# Patient Record
Sex: FEMALE | Race: WHITE | NOT HISPANIC OR LATINO | Employment: FULL TIME | URBAN - NONMETROPOLITAN AREA
[De-identification: names, ages, dates, MRNs, and addresses within clinical notes are randomized per-mention and may not be internally consistent; named-entity substitution may affect disease eponyms.]

---

## 2017-08-28 DIAGNOSIS — M25.561 PAIN IN BOTH KNEES, UNSPECIFIED CHRONICITY: Primary | ICD-10-CM

## 2017-08-28 DIAGNOSIS — M25.562 PAIN IN BOTH KNEES, UNSPECIFIED CHRONICITY: Primary | ICD-10-CM

## 2017-08-29 ENCOUNTER — OFFICE VISIT (OUTPATIENT)
Dept: ORTHOPEDIC SURGERY | Facility: CLINIC | Age: 37
End: 2017-08-29

## 2017-08-29 VITALS — BODY MASS INDEX: 26.52 KG/M2 | WEIGHT: 165 LBS | HEIGHT: 66 IN

## 2017-08-29 DIAGNOSIS — S86.811A STRAIN OF CALF MUSCLE, RIGHT, INITIAL ENCOUNTER: ICD-10-CM

## 2017-08-29 DIAGNOSIS — S86.812A STRAIN OF CALF MUSCLE, LEFT, INITIAL ENCOUNTER: ICD-10-CM

## 2017-08-29 DIAGNOSIS — M25.562 PAIN IN BOTH KNEES, UNSPECIFIED CHRONICITY: Primary | ICD-10-CM

## 2017-08-29 DIAGNOSIS — M25.561 PAIN IN BOTH KNEES, UNSPECIFIED CHRONICITY: Primary | ICD-10-CM

## 2017-08-29 PROCEDURE — 99214 OFFICE O/P EST MOD 30 MIN: CPT | Performed by: ORTHOPAEDIC SURGERY

## 2017-08-29 RX ORDER — MELOXICAM 15 MG/1
15 TABLET ORAL DAILY
Qty: 30 TABLET | Refills: 2 | Status: SHIPPED | OUTPATIENT
Start: 2017-08-29 | End: 2019-02-25

## 2019-02-25 ENCOUNTER — CLINICAL SUPPORT (OUTPATIENT)
Dept: AUDIOLOGY | Facility: CLINIC | Age: 39
End: 2019-02-25

## 2019-02-25 ENCOUNTER — OFFICE VISIT (OUTPATIENT)
Dept: OTOLARYNGOLOGY | Facility: CLINIC | Age: 39
End: 2019-02-25

## 2019-02-25 VITALS — HEIGHT: 67 IN | TEMPERATURE: 97.9 F | WEIGHT: 142.8 LBS | BODY MASS INDEX: 22.41 KG/M2

## 2019-02-25 DIAGNOSIS — H60.319 DIFFUSE OTITIS EXTERNA, UNSPECIFIED CHRONICITY, UNSPECIFIED LATERALITY: Primary | ICD-10-CM

## 2019-02-25 DIAGNOSIS — R42 VERTIGO: ICD-10-CM

## 2019-02-25 DIAGNOSIS — H90.3 SENSORINEURAL HEARING LOSS (SNHL) OF BOTH EARS: ICD-10-CM

## 2019-02-25 DIAGNOSIS — H90.3 SENSORINEURAL HEARING LOSS, BILATERAL: Primary | ICD-10-CM

## 2019-02-25 PROCEDURE — 99204 OFFICE O/P NEW MOD 45 MIN: CPT | Performed by: OTOLARYNGOLOGY

## 2019-02-25 PROCEDURE — 92557 COMPREHENSIVE HEARING TEST: CPT | Performed by: AUDIOLOGIST

## 2019-02-25 PROCEDURE — 92567 TYMPANOMETRY: CPT | Performed by: AUDIOLOGIST

## 2019-02-25 RX ORDER — MECLIZINE HCL 12.5 MG/1
12.5 TABLET ORAL 3 TIMES DAILY PRN
Qty: 30 TABLET | Refills: 0 | Status: SHIPPED | OUTPATIENT
Start: 2019-02-25 | End: 2020-01-30

## 2019-02-25 RX ORDER — CLOBETASOL PROPIONATE 0.5 MG/ML
LOTION TOPICAL
Qty: 118 ML | Refills: 1 | OUTPATIENT
Start: 2019-02-25 | End: 2019-06-18

## 2019-02-25 RX ORDER — MECLIZINE HCL 12.5 MG/1
12.5 TABLET ORAL 3 TIMES DAILY PRN
COMMUNITY
End: 2019-02-25 | Stop reason: SDUPTHER

## 2019-02-25 RX ORDER — RANITIDINE 150 MG/1
150 CAPSULE ORAL
COMMUNITY
End: 2020-01-30

## 2019-02-25 NOTE — PROGRESS NOTES
Subjective   Mami Garcia is a 39 y.o. female.   Itchy ears drainage hearing loss and history of vertigo  History of Present Illness   Patient has a history of itchy draining ear for several months has been worse in the left than the right been gone for a couple years she has had dizziness been treated by others with meclizine.  She is taking meclizine it makes her drowsy dizziness is not really consistent not associate with any particular times a day or any positional changes she notes no consistent hearing loss no fluctuating hearing loss no significant tenderness she says she is noted to have abnormal hearing test at work wears hearing protection but it makes her ears itch worse uncomfortable    She says she has had ear problems that she is 14 years old with ear infections of undetermined type she is allergic to multiple antibiotics  The following portions of the patient's history were reviewed and updated as appropriate: allergies, current medications, past family history, past medical history, past social history, past surgical history and problem list.      Mami Garcia reports that she has quit smoking. she has never used smokeless tobacco. She reports that she does not use drugs.  Patient is not a tobacco user and has been counseled for use of tobacco products    Family History   Problem Relation Age of Onset   • Diabetes Other    • Heart disease Other    • Hypertension Other          Current Outpatient Medications:   •  meclizine (ANTIVERT) 12.5 MG tablet, Take 1 tablet by mouth 3 (Three) Times a Day As Needed for dizziness., Disp: 30 tablet, Rfl: 0  •  ranitidine (ZANTAC) 150 MG capsule, Take 150 mg by mouth., Disp: , Rfl:   •  clobetasol (CLOBEX) 0.05 % lotion, Apply  topically to the appropriate area as directed Daily. Use at night, Disp: 118 mL, Rfl: 1    Allergies   Allergen Reactions   • Amoxicillin    • Biaxin [Clarithromycin]    • Cephalosporins    • Penicillins    • Sulfa  Antibiotics        Past Medical History:   Diagnosis Date   • Abdominal pain    • Acute otitis media    • Acute pharyngitis    • Cyst of ovary    • Dysfunction of eustachian tube    • Dyspareunia    • Dysphagia    • Dysuria    • Epigastric pain    • Fatigue    • Foreign body in vulva and vagina    • Gastroesophageal reflux disease    • Increased frequency of urination    • Left lower quadrant pain    • Lumbosacral strain    • Menometrorrhagia    • Nausea    • Oral herpes     oral mucosal herpes   • Pain in eye     recent hx of grease into OS, no significant eye injury   • Pain in pelvis    • Patient noncompliance, general    • Plantar fasciitis    • Right lower quadrant pain    • Suprapubic pain     suprapubic tenderness   • Upper respiratory infection    • Vertigo    • Vulvovaginitis        Past Surgical History:   Procedure Laterality Date   •  SECTION  10/04/2002    primary low transverse C section, bilaterally tubal clipping with Filshie clips   • DIAGNOSTIC LAPAROSCOPY  2007    minimal endometriosis, adhesions of the lower uterine segment to the bladder       Review of Systems   HENT: Positive for ear discharge and hearing loss.    Neurological: Positive for dizziness.   All other systems reviewed and are negative.          Objective   Physical Exam   Constitutional: She is oriented to person, place, and time. She appears well-developed and well-nourished.   HENT:   Head: Normocephalic and atraumatic.   Right Ear: Hearing, tympanic membrane and external ear normal.   Left Ear: Hearing, tympanic membrane and external ear normal.   Ears:    Nose: Nose normal. No mucosal edema, rhinorrhea, nasal deformity or septal deviation. No epistaxis. Right sinus exhibits no maxillary sinus tenderness and no frontal sinus tenderness. Left sinus exhibits no maxillary sinus tenderness and no frontal sinus tenderness.   Mouth/Throat: Uvula is midline and oropharynx is clear and moist. No trismus in the jaw. Normal  dentition. No oropharyngeal exudate or posterior oropharyngeal edema.   Eyes: Conjunctivae and EOM are normal. Pupils are equal, round, and reactive to light.   Neck: Trachea normal, normal range of motion and phonation normal. Neck supple. No JVD present. No tracheal deviation present. No thyroid mass and no thyromegaly present.   Cardiovascular: Normal rate and regular rhythm.   Pulmonary/Chest: Effort normal and breath sounds normal.   Musculoskeletal: Normal range of motion.   Lymphadenopathy:        Head (right side): No submental, no submandibular, no tonsillar, no preauricular, no posterior auricular and no occipital adenopathy present.        Head (left side): No submental, no submandibular, no tonsillar, no preauricular, no posterior auricular and no occipital adenopathy present.     She has no cervical adenopathy.        Right cervical: No superficial cervical, no deep cervical and no posterior cervical adenopathy present.       Left cervical: No superficial cervical, no deep cervical and no posterior cervical adenopathy present.   Neurological: She is alert and oriented to person, place, and time. No cranial nerve deficit.   Skin: Skin is warm.   Psychiatric: She has a normal mood and affect. Her speech is normal. Thought content normal.   Nursing note and vitals reviewed.    Audiogram was reviewed in detail showing mild high-frequency hearing loss in the high frequencies and normal tympanograms no evidence of fluid or infection      Neurologic exam including gait and stance finger-nose alternating motions cerebellar Romberg Royal-Hallpike all testing was normal  Assessment/Plan   Mami was seen today for hearing loss and ear drainage.    Diagnoses and all orders for this visit:    Diffuse otitis externa, unspecified chronicity, unspecified laterality    Vertigo    Other orders  -     meclizine (ANTIVERT) 12.5 MG tablet; Take 1 tablet by mouth 3 (Three) Times a Day As Needed for dizziness.  -     clobetasol  (CLOBEX) 0.05 % lotion; Apply  topically to the appropriate area as directed Daily. Use at night      I told the patient she should not work if she has to take meclizine she should not work if she is dizzy she does not drive she is dizzy she not do any dangerous activity she is dizzy.  She is convinced that the itchiness and the skin problem tends to make her dizzy.  Told her that is not usually the case we may need to do further workup in any event she should be very careful about activities where she could hurt herself or others and not participate now she is at all dizzy.    She does have a mild hearing high-pitched hearing loss consistent with noise induced hearing loss we talked about strategies to get better earplugs and she is going to talk to the audiologist about that separately told her she could eventually come down for she does not protect her hearing    The dizziness persists down this time we should consider an ENG and other diagnostic testing including imaging we will see her back in a short period time meantime she will use the drops keep her ears dry and not use Q-tips

## 2019-02-25 NOTE — PATIENT INSTRUCTIONS

## 2019-02-25 NOTE — PROGRESS NOTES
STANDARD AUDIOMETRIC EVALUATION      Name:  Mami Garcia  :  1980  Age:  39 y.o.  Date of Evaluation:  2019      HISTORY    Reason for visit:  Mami Garcia is seen today for a hearing evaluation at the request of Dr. Mynor Velasco.  Patient reports she has been having ear problems since she was 14 years old.  She states she has had vertigo for the past 2-3 years.  She states she takes Meclizine for her dizziness.  Reportedly, she failed a hearing test at work in her left ear, and she was told her hearing has gotten worse this year.  She states she wears hearing protection at work.       EVALUATION    See Audiogram    RESULTS        Otoscopy and Tympanometry 226 Hz :  Right Ear:  Otoscopy:  Clear ear canal          Tympanometry:  Middle ear function within normal limits    Left Ear:   Otoscopy:  Clear ear canal        Tympanometry:  Middle ear function within normal limits    Test technique:  Standard Audiometry     Pure Tone Audiometry:   Patient responded to pure tones at 5-35 dB for 250-8000 Hz in right ear, and at 5-30 dB for 250-8000 Hz in left ear.       Speech Audiometry:        Right Ear:  Speech Reception Threshold (SRT) was obtained at 20 dBHL                 Speech Discrimination scores were 100% in quiet when words were presented at 60 dBHL       Left Ear:  Speech Reception Threshold (SRT) was obtained at 20 dBHL                 Speech Discrimination scores were 100% in quiet when words were presented at 60 dBHL    Reliability:   good    IMPRESSIONS:  1.  Tympanometry results are consistent with Middle ear function within normal limits in both ears.  2.  Pure tone results are consistent with normal to mild sloping sensorineural hearing loss for both ears.       RECOMMENDATIONS:  Patient is seeing the Ear Nose and Throat physician immediately following this examination.  It was a pleasure seeing Mami Garcia in Audiology today.  We would be happy to do further  testing or discuss these test as necessary.          This document has been electronically signed by Marie Blair MS CCC-JEFF on February 25, 2019 3:32 PM       Marie Blair MS CCC-A  Licensed Audiologist

## 2019-04-17 ENCOUNTER — APPOINTMENT (OUTPATIENT)
Dept: LAB | Facility: HOSPITAL | Age: 39
End: 2019-04-17

## 2019-04-17 PROCEDURE — 87507 IADNA-DNA/RNA PROBE TQ 12-25: CPT | Performed by: NURSE PRACTITIONER

## 2019-10-07 ENCOUNTER — OFFICE VISIT (OUTPATIENT)
Dept: OTOLARYNGOLOGY | Facility: CLINIC | Age: 39
End: 2019-10-07

## 2019-10-07 VITALS — BODY MASS INDEX: 22.82 KG/M2 | HEIGHT: 67 IN | TEMPERATURE: 97.8 F | WEIGHT: 145.4 LBS

## 2019-10-07 DIAGNOSIS — H90.3 SENSORINEURAL HEARING LOSS (SNHL) OF BOTH EARS: Primary | ICD-10-CM

## 2019-10-07 PROCEDURE — 99213 OFFICE O/P EST LOW 20 MIN: CPT | Performed by: OTOLARYNGOLOGY

## 2019-10-07 NOTE — PATIENT INSTRUCTIONS

## 2019-10-08 NOTE — PROGRESS NOTES
Subjective   Mami Delicia Garcia is a 39 y.o. female.     Follow-up  History of Present Illness   Patient said her ear itchiness is been less of a problem but she is having some dizziness is more bothersome comes and goes and goes she has no pressure no fluctuating hearing loss happens once a week there is no known factor which brings it on she describes as a spinning she takes meclizine and that usually works within an hour to      The following portions of the patient's history were reviewed and updated as appropriate: allergies, current medications, past family history, past medical history, past social history, past surgical history and problem list.      Current Outpatient Medications:   •  meclizine (ANTIVERT) 12.5 MG tablet, Take 1 tablet by mouth 3 (Three) Times a Day As Needed for dizziness., Disp: 30 tablet, Rfl: 0  •  ranitidine (ZANTAC) 150 MG capsule, Take 150 mg by mouth., Disp: , Rfl:     Allergies   Allergen Reactions   • Amoxicillin    • Biaxin [Clarithromycin]    • Cephalosporins    • Penicillins    • Sulfa Antibiotics              Review of Systems   Constitutional: Negative for fever.   HENT: Positive for hearing loss. Negative for ear discharge.    Neurological: Positive for dizziness.           Objective   Physical Exam   Constitutional: She appears well-developed and well-nourished.   HENT:   Head: Normocephalic.   Right Ear: Tympanic membrane, external ear and ear canal normal.   Left Ear: Tympanic membrane, external ear and ear canal normal.   Nose: Nose normal.   Mouth/Throat: Uvula is midline and oropharynx is clear and moist.   Eyes: Conjunctivae are normal.   Neck: Normal range of motion.   Pulmonary/Chest: Effort normal.   Musculoskeletal: Normal range of motion.   Neurological: She is alert.   Psychiatric: She has a normal mood and affect. Thought content normal.   Normal gait normal coordination and no nystagmus on exam        Assessment/Plan   Mami was seen today for  follow-up.    Diagnoses and all orders for this visit:    Sensorineural hearing loss (SNHL) of both ears    Because of her dizziness were going to work this up further and arrange for an ENG, electrocochleography.    she is to be off the meclizine for 3 days prior to test that she should not drive or do any dangerous activity she feels dizzy in order to avoid injury.    This discussed we will see her back in the next few weeks and plan follow-up as the ENG is done which we will try and facilitate as soon as possible  Continue the steroid lotion

## 2019-10-24 ENCOUNTER — CLINICAL SUPPORT (OUTPATIENT)
Dept: AUDIOLOGY | Facility: CLINIC | Age: 39
End: 2019-10-24

## 2019-10-24 DIAGNOSIS — R42 DIZZINESS: Primary | ICD-10-CM

## 2019-10-24 PROCEDURE — 92584 ELECTROCOCHLEOGRAPHY: CPT | Performed by: AUDIOLOGIST

## 2019-10-24 PROCEDURE — 92540 BASIC VESTIBULAR EVALUATION: CPT | Performed by: AUDIOLOGIST

## 2019-10-24 PROCEDURE — 92537 CALORIC VSTBLR TEST W/REC: CPT | Performed by: AUDIOLOGIST

## 2019-10-24 PROCEDURE — 92547 SUPPLEMENTAL ELECTRICAL TEST: CPT | Performed by: AUDIOLOGIST

## 2019-10-30 NOTE — PROGRESS NOTES
ELECTRONYSTAGMOGRAPHY (ENG)    Name:  Mami Garcia  :  1980  Age:  39 y.o.  Date of Evaluation:  10/30/2019  Referring Provider: No ref. provider found     HISTORY      Reason for visit:  Mami Garcia is seen today at the request of Dr. Mynor Velasco for an evaluation of dizziness.  Patient reports she feels dizzy which she describes as an off balance feeling.  She states sometimes she feels spinning when she is lying down.  She states sometimes her ears feel full like they are stopped up.  She states she has a hearing loss, and she has ringing in her ears.  She states the dizziness starts randomly without any specific triggers.  She states she takes Meclizine which seems to help the dizziness go away.      EVOKED POTENTIALS (ABR/ECoG) RESULTS:    During ECoG, SP/AP ratio was .346 in left ear and .347 in right ear.  SP/AP ratio of less than .37 is borderline within normal limits in both ears.        Electronystagmography RESULTS:     Saccades:  Within Normal Limits with no break up noted  Smooth Pursuit:  Within Normal Limits with no break up noted  Gaze, with eyes open:  No Gaze nystagmus recorded or observed  Spontaneous, with eyes closed:  Consistent right-beating nystagmus present during all eye closed positions: Eyes right at 6.7 degrees/second, Eyes center at 4.4 degrees/second, Eyes Left at 4.8 degrees/second.  Right-beating nystagmus is stronger when eyes turned to the right, following Jose's Law.  Optokinetics:  Within normal limits and symmetrical  Odalys-Hallpike:  Negative for BPPV at this time  Positional:   Right-beating nystagmus present during all 5 head positions:  Supine at 3.6 deg/sec, Head Right at 5.7 deg/sec, Head Left at 2.8 deg/sec, Right Side-lying at 7.2 deg/sec, and Left Side-lying at 5.7 deg/sec.  Right-beating nystagmus was the strongest during Right Side-lying position.  Bithermal Calorics:  Caloric Weakness 7% in right ear.  Directional Preponderance 13%  towards right.     Visual Fixation Suppression:  Good visual fixation suppression      IMPRESSIONS:  1.  ECoG results SP/AP ratio of slightly less than .37 is borderline within normal limits bilaterally.   2.  ENG results:  Right-beating spontaneous nystagmus beats towards the stronger ear, indicating a left peripheral vestibular lesion of recent onset.  Right-beating, direction-fixed, positional nystagmus present in all head positions also suggests a peripheral vestibular lesion.    RECOMMENDATIONS:  Test results will be forwarded to Dr. Mynor Velasco for his review.  It was a pleasure seeing Mami Garcia in Audiology today.  We would be happy to do further testing or discuss these tests as necessary.            This document has been electronically signed by Marie Blair MS CCC-JEFF on October 30, 2019 8:15 AM       Marie Blair MS CCC-JEFF  Licensed Audiologist

## 2019-11-18 ENCOUNTER — OFFICE VISIT (OUTPATIENT)
Dept: OTOLARYNGOLOGY | Facility: CLINIC | Age: 39
End: 2019-11-18

## 2019-11-18 VITALS — WEIGHT: 146.6 LBS | HEIGHT: 67 IN | TEMPERATURE: 98.5 F | BODY MASS INDEX: 23.01 KG/M2

## 2019-11-18 DIAGNOSIS — H60.319 DIFFUSE OTITIS EXTERNA, UNSPECIFIED CHRONICITY, UNSPECIFIED LATERALITY: ICD-10-CM

## 2019-11-18 DIAGNOSIS — R42 VERTIGO: ICD-10-CM

## 2019-11-18 DIAGNOSIS — H90.3 SENSORINEURAL HEARING LOSS (SNHL) OF BOTH EARS: Primary | ICD-10-CM

## 2019-11-18 PROCEDURE — 99213 OFFICE O/P EST LOW 20 MIN: CPT | Performed by: OTOLARYNGOLOGY

## 2019-11-18 RX ORDER — CIPROFLOXACIN AND DEXAMETHASONE 3; 1 MG/ML; MG/ML
4 SUSPENSION/ DROPS AURICULAR (OTIC) 2 TIMES DAILY
Qty: 7.5 ML | Refills: 0 | Status: SHIPPED | OUTPATIENT
Start: 2019-11-18 | End: 2020-01-30

## 2019-11-18 NOTE — PROGRESS NOTES
Subjective   Mami Delicia Garcia is a 39 y.o. female.   Follow-up dizziness    History of Present Illness   She states she has not had use meclizine recently and she does not like to use it because it makes her drowsy her dizziness is much better than it had been there is no unusual dizziness recently in terms of causing her to have imbalance falling she continues to work and do normal activity comes for review of her EMG she has no ear drainage to speak of though feels some tonsil moist she does not actually notice drainage      The following portions of the patient's history were reviewed and updated as appropriate: allergies, current medications, past family history, past medical history, past social history, past surgical history and problem list.      Current Outpatient Medications:   •  ciprofloxacin-dexamethasone (CIPRODEX) 0.3-0.1 % otic suspension, Administer 4 drops into the left ear 2 (Two) Times a Day. Use for 4 days, Disp: 7.5 mL, Rfl: 0  •  meclizine (ANTIVERT) 12.5 MG tablet, Take 1 tablet by mouth 3 (Three) Times a Day As Needed for dizziness., Disp: 30 tablet, Rfl: 0  •  ranitidine (ZANTAC) 150 MG capsule, Take 150 mg by mouth., Disp: , Rfl:     Allergies   Allergen Reactions   • Amoxicillin    • Biaxin [Clarithromycin]    • Cephalosporins    • Penicillins    • Sulfa Antibiotics              Review of Systems   Constitutional: Negative for fever.   HENT: Positive for hearing loss. Negative for ear discharge and ear pain.         Occasional left ear itching   Allergic/Immunologic: Positive for environmental allergies.   Neurological: Negative for dizziness.   Hematological: Negative for adenopathy.           Objective   Physical Exam   Constitutional: She appears well-developed and well-nourished.   HENT:   Head: Normocephalic.   Right Ear: Tympanic membrane, external ear and ear canal normal.   Left Ear: Tympanic membrane, external ear and ear canal normal.   Ears:    Nose: Nose normal.    Mouth/Throat: Uvula is midline and oropharynx is clear and moist.   Eyes: Conjunctivae and EOM are normal.   Neck: Normal range of motion.   Pulmonary/Chest: Effort normal.   Musculoskeletal: Normal range of motion.   Neurological: She is alert.   Psychiatric: She has a normal mood and affect. Thought content normal.   Nursing note and vitals reviewed.        ENG was reviewed in detail with the patient showing the results of the left-sided weakness  Assessment/Plan   Mami was seen today for follow-up.    Diagnoses and all orders for this visit:    Sensorineural hearing loss (SNHL) of both ears    Vertigo  -     Ambulatory Referral to Physical Therapy Vestibular    Diffuse otitis externa, unspecified chronicity, unspecified laterality    Other orders  -     ciprofloxacin-dexamethasone (CIPRODEX) 0.3-0.1 % otic suspension; Administer 4 drops into the left ear 2 (Two) Times a Day. Use for 4 days    Just use the drops only as needed basis for 4 days in left ear she has itching.  She will continue using her muscle versus earplugs as much as possible for work noise protection    We will recheck her hearing on follow-up in the meantime getting vestibular therapy set up to try to help with the balance based on the EMG findings

## 2019-11-18 NOTE — PATIENT INSTRUCTIONS

## 2020-03-04 PROBLEM — J11.1 INFLUENZA-LIKE ILLNESS: Status: ACTIVE | Noted: 2020-03-04

## 2020-03-04 PROCEDURE — 87086 URINE CULTURE/COLONY COUNT: CPT | Performed by: NURSE PRACTITIONER

## 2020-03-04 PROCEDURE — 87109 MYCOPLASMA: CPT | Performed by: NURSE PRACTITIONER

## 2020-07-27 ENCOUNTER — OFFICE VISIT (OUTPATIENT)
Dept: OTOLARYNGOLOGY | Facility: CLINIC | Age: 40
End: 2020-07-27

## 2020-07-27 VITALS — WEIGHT: 143 LBS | BODY MASS INDEX: 22.44 KG/M2 | HEIGHT: 67 IN | TEMPERATURE: 97.7 F

## 2020-07-27 DIAGNOSIS — H60.319 DIFFUSE OTITIS EXTERNA, UNSPECIFIED CHRONICITY, UNSPECIFIED LATERALITY: ICD-10-CM

## 2020-07-27 DIAGNOSIS — H90.3 SENSORINEURAL HEARING LOSS (SNHL) OF BOTH EARS: Primary | ICD-10-CM

## 2020-07-27 DIAGNOSIS — R42 VERTIGO: ICD-10-CM

## 2020-07-27 PROCEDURE — 99213 OFFICE O/P EST LOW 20 MIN: CPT | Performed by: OTOLARYNGOLOGY

## 2020-07-27 RX ORDER — MECLIZINE HYDROCHLORIDE 25 MG/1
25 TABLET ORAL 3 TIMES DAILY PRN
COMMUNITY
End: 2021-01-12

## 2020-07-27 RX ORDER — BETAMETHASONE DIPROPIONATE 0.5 MG/G
LOTION TOPICAL 2 TIMES DAILY
Qty: 60 ML | Refills: 1 | Status: SHIPPED | OUTPATIENT
Start: 2020-07-27 | End: 2021-10-13 | Stop reason: HOSPADM

## 2020-07-27 NOTE — PROGRESS NOTES
Subjective   Mami Delicia Garcia is a 40 y.o. female.   Ear problem    History of Present Illness   This patient comes back 8 months after she was last seen she no showed both in our office and was for vestibular therapy.  Says she has some discomfort in her ears and mainly itching she does not have any drainage no fever chills she is has a known history of otitis externa.  She also says she is having dizziness particular last 2 weeks she had been previously arranged to have physical therapy for positional type of dizziness she is having but never followed up she now comes back 8 months later because of similar complaints worsening she denies any major change in her hearing does have some congestion has no fever chills    Patient states her symptoms are usually short-lived but associated with motion particular turning head she is lying down one side or the other  The following portions of the patient's history were reviewed and updated as appropriate: allergies, current medications, past family history, past medical history, past social history, past surgical history and problem list.      Current Outpatient Medications:   •  meclizine (ANTIVERT) 25 MG tablet, Take 25 mg by mouth 3 (Three) Times a Day As Needed for Dizziness., Disp: , Rfl:   •  betamethasone dipropionate (DIPROLENE) 0.05 % lotion, Apply  topically to the appropriate area as directed 2 (Two) Times a Day., Disp: 60 mL, Rfl: 1    Allergies   Allergen Reactions   • Amoxicillin    • Biaxin [Clarithromycin]    • Cephalosporins    • Penicillins    • Sulfa Antibiotics              Review of Systems   Constitutional: Negative for fever.   HENT: Positive for congestion and ear pain. Negative for ear discharge, facial swelling and hearing loss.    Allergic/Immunologic: Positive for environmental allergies.   Neurological: Positive for dizziness. Negative for syncope.   Hematological: Negative for adenopathy.           Objective   Physical Exam      Constitutional: She appears well-developed and well-nourished.   HENT:   Head: Normocephalic.   Right Ear: Hearing, tympanic membrane, external ear and ear canal normal.   Left Ear: Hearing, tympanic membrane, external ear and ear canal normal.   Ears:    Nose: Mucosal edema present.   Mouth/Throat: Oropharynx is clear and moist and mucous membranes are normal.   Eyes: Conjunctivae are normal.   Neck: Neck supple.   Pulmonary/Chest: Effort normal.   Neurological: She is alert.   Skin: Skin is warm.   Psychiatric: She has a normal mood and affect.   Nursing note and vitals reviewed.        Old ENG and audiogram showing mild sensorineural hearing loss borderline was noted  Assessment/Plan   Mami was seen today for dizziness.    Diagnoses and all orders for this visit:    Sensorineural hearing loss (SNHL) of both ears  -     Ambulatory Referral to Physical Therapy Vestibular    Vertigo  -     Ambulatory Referral to Physical Therapy Vestibular    Diffuse otitis externa, unspecified chronicity, unspecified laterality    Other orders  -     betamethasone dipropionate (DIPROLENE) 0.05 % lotion; Apply  topically to the appropriate area as directed 2 (Two) Times a Day.    Begin Diprolene lotion to see if we can help with the itching I see no evidence of active infection is the vertigo her symptoms are clearly positional head changes and I think that she should see the therapist I do not think giving vestibular suppressant drugs is wise talked to the mouth once avoiding dangerous activities particularly driving other activities    We will see her back and reassess both the symptoms and check her hearing on follow-up and decide if further testing needs to be done she already had a previous EMG which documented some vestibular weakness and asked why she was referred previously to physical therapy but never followed up

## 2020-07-27 NOTE — PATIENT INSTRUCTIONS
MyPlate from USDA    MyPlate is an outline of a general healthy diet based on the 2010 Dietary Guidelines for Americans, from the U.S. Department of Agriculture (USDA). It sets guidelines for how much food you should eat from each food group based on your age, sex, and level of physical activity.  What are tips for following MyPlate?  To follow MyPlate recommendations:  · Eat a wide variety of fruits and vegetables, grains, and protein foods.  · Serve smaller portions and eat less food throughout the day.  · Limit portion sizes to avoid overeating.  · Enjoy your food.  · Get at least 150 minutes of exercise every week. This is about 30 minutes each day, 5 or more days per week.  It can be difficult to have every meal look like MyPlate. Think about MyPlate as eating guidelines for an entire day, rather than each individual meal.  Fruits and vegetables  · Make half of your plate fruits and vegetables.  · Eat many different colors of fruits and vegetables each day.  · For a 2,000 calorie daily food plan, eat:  ? 2½ cups of vegetables every day.  ? 2 cups of fruit every day.  · 1 cup is equal to:  ? 1 cup raw or cooked vegetables.  ? 1 cup raw fruit.  ? 1 medium-sized orange, apple, or banana.  ? 1 cup 100% fruit or vegetable juice.  ? 2 cups raw leafy greens, such as lettuce, spinach, or kale.  ? ½ cup dried fruit.  Grains  · One fourth of your plate should be grains.  · Make at least half of the grains you eat each day whole grains.  · For a 2,000 calorie daily food plan, eat 6 oz of grains every day.  · 1 oz is equal to:  ? 1 slice bread.  ? 1 cup cereal.  ? ½ cup cooked rice, cereal, or pasta.  Protein  · One fourth of your plate should be protein.  · Eat a wide variety of protein foods, including meat, poultry, fish, eggs, beans, nuts, and tofu.  · For a 2,000 calorie daily food plan, eat 5½ oz of protein every day.  · 1 oz is equal to:  ? 1 oz meat, poultry, or fish.  ? ¼ cup cooked beans.  ? 1 egg.  ? ½ oz nuts  or seeds.  ? 1 Tbsp peanut butter.  Dairy  · Drink fat-free or low-fat (1%) milk.  · Eat or drink dairy as a side to meals.  · For a 2,000 calorie daily food plan, eat or drink 3 cups of dairy every day.  · 1 cup is equal to:  ? 1 cup milk, yogurt, cottage cheese, or soy milk (soy beverage).  ? 2 oz processed cheese.  ? 1½ oz natural cheese.  Fats, oils, salt, and sugars  · Only small amounts of oils are recommended.  · Avoid foods that are high in calories and low in nutritional value (empty calories), like foods high in fat or added sugars.  · Choose foods that are low in salt (sodium). Choose foods that have less than 140 milligrams (mg) of sodium per serving.  · Drink water instead of sugary drinks. Drink enough water each day to keep your urine pale yellow.  Where to find support  · Work with your health care provider or a nutrition specialist (dietitian) to develop a customized eating plan that is right for you.  · Download an phill (mobile application) to help you track your daily food intake.  Where to find more information  · Go to ChooseMyPlate.gov for more information.  · Learn more and log your daily food intake according to MyPlate using USDA's SuperTracker: www.Maana Mobileer.usda.gov  Summary  · MyPlate is a general guideline for healthy eating from the USDA. It is based on the 2010 Dietary Guidelines for Americans.  · In general, fruits and vegetables should take up ½ of your plate, grains should take up ¼ of your plate, and protein should take up ¼ of your plate.  This information is not intended to replace advice given to you by your health care provider. Make sure you discuss any questions you have with your health care provider.  Document Released: 01/06/2009 Document Revised: 01/19/2019 Document Reviewed: 03/19/2018  Elsevier Patient Education © 2020 Elsevier Inc.

## 2020-07-30 ENCOUNTER — TELEPHONE (OUTPATIENT)
Dept: OTOLARYNGOLOGY | Facility: CLINIC | Age: 40
End: 2020-07-30

## 2020-07-30 NOTE — TELEPHONE ENCOUNTER
07/30/2020, 1059 - Patient telephoned.  Zero answer.  Voice message submitted with date, time, office contact information, and instruction to contact office regarding information needed for medical leave request.

## 2020-08-05 ENCOUNTER — TELEPHONE (OUTPATIENT)
Dept: OTOLARYNGOLOGY | Facility: CLINIC | Age: 40
End: 2020-08-05

## 2020-08-05 NOTE — TELEPHONE ENCOUNTER
08/05/2020, 1520 - Patient telephoned.  Zero answer.  Voice message submitted with date, time, office contact information, notification previous message submitted 07/30/2020 regarding additional information required for medical leave request, and instructions to contact office at earliest convenience.

## 2020-08-05 NOTE — TELEPHONE ENCOUNTER
----- Message from Fabio Guerrero sent at 8/5/2020  2:56 PM CDT -----  Contact: [phone]  [facility] papers from [employer], on behalf of patient's employer [employer], were supposed to be faxed to our office about a week ago. They said they have not received them back yet and patient is wanting to know if our office ever received the paperwork.

## 2020-08-06 ENCOUNTER — APPOINTMENT (OUTPATIENT)
Dept: PHYSICAL THERAPY | Facility: HOSPITAL | Age: 40
End: 2020-08-06

## 2020-08-06 ENCOUNTER — TELEPHONE (OUTPATIENT)
Dept: OTOLARYNGOLOGY | Facility: CLINIC | Age: 40
End: 2020-08-06

## 2020-08-06 NOTE — TELEPHONE ENCOUNTER
08/06/2020, 1539 - Patient telephoned with notification medical leave request forms have been completed, and she may collect them from the office of Dr. Velasco.  Patient verbalized understanding.

## 2020-08-11 ENCOUNTER — APPOINTMENT (OUTPATIENT)
Dept: PHYSICAL THERAPY | Facility: HOSPITAL | Age: 40
End: 2020-08-11

## 2020-08-17 ENCOUNTER — HOSPITAL ENCOUNTER (OUTPATIENT)
Dept: PHYSICAL THERAPY | Facility: HOSPITAL | Age: 40
Setting detail: THERAPIES SERIES
Discharge: HOME | End: 2020-08-17

## 2020-08-17 ENCOUNTER — TELEPHONE (OUTPATIENT)
Dept: OTOLARYNGOLOGY | Facility: CLINIC | Age: 40
End: 2020-08-17

## 2020-08-17 DIAGNOSIS — R42 VERTIGO: Primary | ICD-10-CM

## 2020-08-17 PROCEDURE — 97162 PT EVAL MOD COMPLEX 30 MIN: CPT | Performed by: PHYSICAL THERAPIST

## 2020-08-17 NOTE — THERAPY EVALUATION
Outpatient Physical Therapy Ortho Initial Evaluation  [facility]     Patient Name: Mami Garcia  : 1980  MRN: [id number]  Today's Date: 2020      Visit Date: 2020  Visit   Return to MD: JIM  Re-cert date: N/A    Patient Active Problem List   Diagnosis   • Pain in both knees   • Influenza-like illness        Past Medical History:   Diagnosis Date   • Abdominal pain    • Acute otitis media    • Acute pharyngitis    • Cyst of ovary    • Dysfunction of eustachian tube    • Dyspareunia    • Dysphagia    • Dysuria    • Epigastric pain    • Fatigue    • Foreign body in vulva and vagina    • Gastroesophageal reflux disease    • Increased frequency of urination    • Left lower quadrant pain    • Lumbosacral strain    • Menometrorrhagia    • Nausea    • Oral herpes     oral mucosal herpes   • Pain in eye     recent hx of grease into OS, no significant eye injury   • Pain in pelvis    • Patient noncompliance, general    • Plantar fasciitis    • Right lower quadrant pain    • Suprapubic pain     suprapubic tenderness   • Upper respiratory infection    • Vertigo    • Vulvovaginitis         Past Surgical History:   Procedure Laterality Date   •  SECTION  10/04/2002    primary low transverse C section, bilaterally tubal clipping with Filshie clips   • DIAGNOSTIC LAPAROSCOPY  2007    minimal endometriosis, adhesions of the lower uterine segment to the bladder       Visit Dx:     ICD-10-CM ICD-9-CM   1. Vertigo R42 780.4     Medications (Admitted on 2020)     betamethasone dipropionate (DIPROLENE) 0.05 % lotion     meclizine (ANTIVERT) 25 MG tablet      Allergies       Amoxicillin   Biaxin [Clarithromycin]   Cephalosporins   Penicillins   Sulfa Antibiotics             PT Ortho     Row Name 20 1600       Subjective Comments    Subjective Comments  39 yo female with insidious onset dizziness 3 years ago, diagnosed with vertigo at the hospital at the time. Prescribed  meclizine at the time which helped resolve her condition at the time. Has itchy ears in the past, takes drops for her ears. Has not had a dizzy spell for 3 weeks but before that had dizzy spells lasting a month prior to that. Aggravating factors: supine <>sit, bending over to tie shoes. Easing factors: laying on her back, meds-meclizine    -BS       Precautions and Contraindications    Precautions  vertigo  -BS       Subjective Pain    Able to rate subjective pain?  yes  -BS    Pre-Treatment Pain Level  0  -BS    Post-Treatment Pain Level  0  -BS       Posture/Observations    Posture/Observations Comments  rolling L to R produced dizziness, no nystagmus, eye tracking and quick head movements did not provoke any vertigo symptoms  -BS       General ROM    GENERAL ROM COMMENTS  AROM: B UE's grossly WFL, B LE's grossly WFL  -BS       MMT (Manual Muscle Testing)    General MMT Comments  5/5 B UE's and B LE's grossly  -BS       Sensation    Light Touch  No apparent deficits  -BS      User Key  (r) = Recorded By, (t) = Taken By, (c) = Cosigned By    Initials Name Provider Type    Santi Sosa, PT Physical Therapist                      Therapy Education  Education Details: Epley maneuver  Given: Other (comment)(vestibular rehab)  Program: New  How Provided: Demonstration  Provided to: Patient  Level of Understanding: Verbalized     PT OP Goals     Row Name 08/17/20 1600          PT Short Term Goals    STG Date to Achieve  08/28/20  -BS     STG 1  Pt indep with performing Home Epley maneuver  -BS     STG 1 Progress  New  -BS     STG 2  Able to perform all daily tasks without any vertigo symptoms  -BS     STG 2 Progress  New  -BS        Time Calculation    PT Goal Re-Cert Due Date  -- N/A  -BS       User Key  (r) = Recorded By, (t) = Taken By, (c) = Cosigned By    Initials Name Provider Type    Santi Sosa PT Physical Therapist          PT Assessment/Plan     Row Name 08/17/20 1700          PT Assessment     Assessment Comments  41 yo female presenting with slightly symptomatic case of BPPV. Elicited only with rolling to the right on the mat table. Resolved after performing the Epley maneuver going to the right.  -BS     Please refer to paper survey for additional self-reported information  Yes  -BS     Rehab Potential  Good  -BS     Patient/caregiver participated in establishment of treatment plan and goals  Yes  -BS     Patient would benefit from skilled therapy intervention  Yes  -BS        PT Plan    PT Frequency  Other (comment) 2 visits  -BS     Predicted Duration of Therapy Intervention (Therapy Eval)  2 visits  -BS     Planned CPT's?  PT EVAL MOD COMPLELITY: 60504;PT CANALITH REPOSITIONIN;PT THER PROC EA 15 MIN: 60609;PT NEUROMUSC RE-EDUCATION EA 15 MIN: 65622  -BS     Physical Therapy Interventions (Optional Details)  vestibular training  -BS     PT Plan Comments  f/u in 1 week to discern if vertigo has cleared completely.  -BS       User Key  (r) = Recorded By, (t) = Taken By, (c) = Cosigned By    Initials Name Provider Type    Santi Sosa, PT Physical Therapist            OP Exercises     Row Name 20 1600             Subjective Comments    Subjective Comments  41 yo female with insidious onset dizziness 3 years ago, diagnosed with vertigo at the hospital at the time. Prescribed meclizine at the time which helped resolve her condition at the time. Has itchy ears in the past, takes drops for her ears. Has not had a dizzy spell for 3 weeks but before that had dizzy spells lasting a month prior to that. Aggravating factors: supine <>sit, bending over to tie shoes. Easing factors: laying on her back, meds-meclizine    -BS         Subjective Pain    Able to rate subjective pain?  yes  -BS      Pre-Treatment Pain Level  0  -BS      Post-Treatment Pain Level  0  -BS         Exercise 1    Exercise Name 1  Epley maneuver going to the right  -BS      Time 1  4'  -BS        User Key  (r) = Recorded  By, (t) = Taken By, (c) = Cosigned By    Initials Name Provider Type    Santi Sosa, PT Physical Therapist                        Outcome Measure Options: Dizziness Handicap Inventory(P 10, E 10, F 22)         Time Calculation:     Start Time: 1649  Stop Time: 1714  Time Calculation (min): 25 min  Total Timed Code Minutes- PT: 25 minute(s)     Therapy Charges for Today     Code Description Service Date Service Provider Modifiers Qty    52755535184 HC PT EVAL MOD COMPLEXITY 2 8/17/2020 Santi Castañeda, PT GP 1          PT G-Codes  Outcome Measure Options: Dizziness Handicap Inventory(P 10, E 10, F 22)         Santi Castañeda, PT  8/17/2020

## 2020-08-21 ENCOUNTER — TELEPHONE (OUTPATIENT)
Dept: OTOLARYNGOLOGY | Facility: CLINIC | Age: 40
End: 2020-08-21

## 2020-08-21 NOTE — TELEPHONE ENCOUNTER
----- Message from Fabio Guerrero sent at 8/20/2020  3:08 PM CDT -----  Contact: [phone]  Left voicemail that she was wanting to know if you got the paperwork faxed off for her.

## 2020-09-21 ENCOUNTER — TELEPHONE (OUTPATIENT)
Dept: OTOLARYNGOLOGY | Facility: CLINIC | Age: 40
End: 2020-09-21

## 2020-09-21 NOTE — TELEPHONE ENCOUNTER
"09/21/2020, 0929 - Patient telephoned per this staff member [phone].  Zero answer.  Voice message submitted with request to contact office regarding the rescheduling of clinical appointment.    Note - Patient deemed a \"No Show\" for clinical appointment with Dr. Velasco Friday, September 18, 2020 at 8:15 A.M.   "

## 2020-12-25 ENCOUNTER — HOSPITAL ENCOUNTER (EMERGENCY)
Facility: HOSPITAL | Age: 40
Discharge: HOME | End: 2020-12-25
Attending: FAMILY MEDICINE | Admitting: FAMILY MEDICINE

## 2020-12-25 VITALS
SYSTOLIC BLOOD PRESSURE: 106 MMHG | OXYGEN SATURATION: 100 % | WEIGHT: 140 LBS | TEMPERATURE: 98.1 F | HEART RATE: 66 BPM | HEIGHT: 67 IN | DIASTOLIC BLOOD PRESSURE: 51 MMHG | BODY MASS INDEX: 21.97 KG/M2 | RESPIRATION RATE: 18 BRPM

## 2020-12-25 DIAGNOSIS — E83.42 HYPOMAGNESEMIA: Primary | ICD-10-CM

## 2020-12-25 LAB
ALBUMIN SERPL-MCNC: 4.4 G/DL (ref 3.5–5.2)
ALBUMIN/GLOB SERPL: 1.8 G/DL
ALP SERPL-CCNC: 45 U/L (ref 39–117)
ALT SERPL W P-5'-P-CCNC: 8 U/L (ref 1–33)
ANION GAP SERPL CALCULATED.3IONS-SCNC: 8 MMOL/L (ref 5–15)
AST SERPL-CCNC: 13 U/L (ref 1–32)
BASOPHILS # BLD AUTO: 0.02 10*3/MM3 (ref 0–0.2)
BASOPHILS NFR BLD AUTO: 0.3 % (ref 0–1.5)
BILIRUB SERPL-MCNC: 0.4 MG/DL (ref 0–1.2)
BILIRUB UR QL STRIP: NEGATIVE
BUN SERPL-MCNC: 13 MG/DL (ref 6–20)
BUN/CREAT SERPL: 19.7 (ref 7–25)
CALCIUM SPEC-SCNC: 9.9 MG/DL (ref 8.6–10.5)
CHLORIDE SERPL-SCNC: 108 MMOL/L (ref 98–107)
CK SERPL-CCNC: 51 U/L (ref 20–180)
CLARITY UR: CLEAR
CO2 SERPL-SCNC: 22 MMOL/L (ref 22–29)
COLOR UR: YELLOW
CREAT SERPL-MCNC: 0.66 MG/DL (ref 0.57–1)
D-DIMER, QUANTITATIVE (MAD,POW, STR): 322 NG/ML (FEU) (ref 0–470)
DEPRECATED RDW RBC AUTO: 42.3 FL (ref 37–54)
EOSINOPHIL # BLD AUTO: 0.04 10*3/MM3 (ref 0–0.4)
EOSINOPHIL NFR BLD AUTO: 0.7 % (ref 0.3–6.2)
ERYTHROCYTE [DISTWIDTH] IN BLOOD BY AUTOMATED COUNT: 12.5 % (ref 12.3–15.4)
GFR SERPL CREATININE-BSD FRML MDRD: 99 ML/MIN/1.73
GLOBULIN UR ELPH-MCNC: 2.4 GM/DL
GLUCOSE SERPL-MCNC: 93 MG/DL (ref 65–99)
GLUCOSE UR STRIP-MCNC: NEGATIVE MG/DL
HCG SERPL QL: NEGATIVE
HCT VFR BLD AUTO: 37.3 % (ref 34–46.6)
HGB BLD-MCNC: 12.5 G/DL (ref 12–15.9)
HGB UR QL STRIP.AUTO: NEGATIVE
HOLD SPECIMEN: NORMAL
HOLD SPECIMEN: NORMAL
IMM GRANULOCYTES # BLD AUTO: 0.01 10*3/MM3 (ref 0–0.05)
IMM GRANULOCYTES NFR BLD AUTO: 0.2 % (ref 0–0.5)
INR PPP: 1.31 (ref 0.8–1.2)
KETONES UR QL STRIP: ABNORMAL
LEUKOCYTE ESTERASE UR QL STRIP.AUTO: NEGATIVE
LYMPHOCYTES # BLD AUTO: 1.46 10*3/MM3 (ref 0.7–3.1)
LYMPHOCYTES NFR BLD AUTO: 23.8 % (ref 19.6–45.3)
MAGNESIUM SERPL-MCNC: 1.3 MG/DL (ref 1.6–2.6)
MCH RBC QN AUTO: 30.9 PG (ref 26.6–33)
MCHC RBC AUTO-ENTMCNC: 33.5 G/DL (ref 31.5–35.7)
MCV RBC AUTO: 92.3 FL (ref 79–97)
MONOCYTES # BLD AUTO: 0.35 10*3/MM3 (ref 0.1–0.9)
MONOCYTES NFR BLD AUTO: 5.7 % (ref 5–12)
NEUTROPHILS NFR BLD AUTO: 4.26 10*3/MM3 (ref 1.7–7)
NEUTROPHILS NFR BLD AUTO: 69.3 % (ref 42.7–76)
NITRITE UR QL STRIP: NEGATIVE
NRBC BLD AUTO-RTO: 0 /100 WBC (ref 0–0.2)
PH UR STRIP.AUTO: 5.5 [PH] (ref 5–9)
PLATELET # BLD AUTO: 214 10*3/MM3 (ref 140–450)
PMV BLD AUTO: 11.9 FL (ref 6–12)
POTASSIUM SERPL-SCNC: 4 MMOL/L (ref 3.5–5.2)
PROT SERPL-MCNC: 6.8 G/DL (ref 6–8.5)
PROT UR QL STRIP: NEGATIVE
PROTHROMBIN TIME: 16.9 SECONDS (ref 11.1–15.3)
RBC # BLD AUTO: 4.04 10*6/MM3 (ref 3.77–5.28)
SODIUM SERPL-SCNC: 138 MMOL/L (ref 136–145)
SP GR UR STRIP: 1.02 (ref 1–1.03)
UROBILINOGEN UR QL STRIP: ABNORMAL
WBC # BLD AUTO: 6.14 10*3/MM3 (ref 3.4–10.8)
WHOLE BLOOD HOLD SPECIMEN: NORMAL
WHOLE BLOOD HOLD SPECIMEN: NORMAL

## 2020-12-25 PROCEDURE — 85610 PROTHROMBIN TIME: CPT | Performed by: FAMILY MEDICINE

## 2020-12-25 PROCEDURE — 85379 FIBRIN DEGRADATION QUANT: CPT | Performed by: FAMILY MEDICINE

## 2020-12-25 PROCEDURE — 85025 COMPLETE CBC W/AUTO DIFF WBC: CPT | Performed by: FAMILY MEDICINE

## 2020-12-25 PROCEDURE — 84703 CHORIONIC GONADOTROPIN ASSAY: CPT | Performed by: FAMILY MEDICINE

## 2020-12-25 PROCEDURE — 25010000002 MAGNESIUM SULFATE 2 GM/50ML SOLUTION: Performed by: FAMILY MEDICINE

## 2020-12-25 PROCEDURE — 82550 ASSAY OF CK (CPK): CPT | Performed by: FAMILY MEDICINE

## 2020-12-25 PROCEDURE — 80053 COMPREHEN METABOLIC PANEL: CPT | Performed by: FAMILY MEDICINE

## 2020-12-25 PROCEDURE — 99283 EMERGENCY DEPT VISIT LOW MDM: CPT

## 2020-12-25 PROCEDURE — 81003 URINALYSIS AUTO W/O SCOPE: CPT | Performed by: FAMILY MEDICINE

## 2020-12-25 PROCEDURE — 83735 ASSAY OF MAGNESIUM: CPT | Performed by: FAMILY MEDICINE

## 2020-12-25 PROCEDURE — 96365 THER/PROPH/DIAG IV INF INIT: CPT

## 2020-12-25 RX ORDER — MAGNESIUM SULFATE HEPTAHYDRATE 40 MG/ML
2 INJECTION, SOLUTION INTRAVENOUS ONCE
Status: COMPLETED | OUTPATIENT
Start: 2020-12-25 | End: 2020-12-25

## 2020-12-25 RX ORDER — ONDANSETRON 4 MG/1
4 TABLET, ORALLY DISINTEGRATING ORAL EVERY 6 HOURS PRN
Qty: 10 TABLET | Refills: 0 | Status: SHIPPED | OUTPATIENT
Start: 2020-12-25 | End: 2021-10-13 | Stop reason: HOSPADM

## 2020-12-25 RX ADMIN — SODIUM CHLORIDE 1000 ML: 9 INJECTION, SOLUTION INTRAVENOUS at 17:53

## 2020-12-25 RX ADMIN — MAGNESIUM SULFATE IN WATER 2 G: 40 INJECTION, SOLUTION INTRAVENOUS at 18:11

## 2020-12-26 LAB — SARS-COV-2 N GENE RESP QL NAA+PROBE: NOT DETECTED

## 2020-12-26 PROCEDURE — 87635 SARS-COV-2 COVID-19 AMP PRB: CPT | Performed by: FAMILY MEDICINE

## 2021-01-12 ENCOUNTER — LAB (OUTPATIENT)
Dept: LAB | Facility: HOSPITAL | Age: 41
End: 2021-01-12

## 2021-01-12 ENCOUNTER — OFFICE VISIT (OUTPATIENT)
Dept: FAMILY MEDICINE CLINIC | Facility: CLINIC | Age: 41
End: 2021-01-12

## 2021-01-12 VITALS
BODY MASS INDEX: 23.45 KG/M2 | SYSTOLIC BLOOD PRESSURE: 108 MMHG | WEIGHT: 149.4 LBS | DIASTOLIC BLOOD PRESSURE: 60 MMHG | HEIGHT: 67 IN

## 2021-01-12 DIAGNOSIS — Z11.59 ENCOUNTER FOR HEPATITIS C SCREENING TEST FOR LOW RISK PATIENT: ICD-10-CM

## 2021-01-12 DIAGNOSIS — E83.42 HYPOMAGNESEMIA: Primary | ICD-10-CM

## 2021-01-12 DIAGNOSIS — Z01.419 WELL WOMAN EXAM: ICD-10-CM

## 2021-01-12 PROBLEM — M25.562 PAIN IN BOTH KNEES: Status: RESOLVED | Noted: 2017-08-29 | Resolved: 2021-01-12

## 2021-01-12 PROBLEM — J11.1 INFLUENZA-LIKE ILLNESS: Status: RESOLVED | Noted: 2020-03-04 | Resolved: 2021-01-12

## 2021-01-12 PROBLEM — M25.561 PAIN IN BOTH KNEES: Status: RESOLVED | Noted: 2017-08-29 | Resolved: 2021-01-12

## 2021-01-12 PROCEDURE — 86803 HEPATITIS C AB TEST: CPT | Performed by: FAMILY MEDICINE

## 2021-01-12 PROCEDURE — 80048 BASIC METABOLIC PNL TOTAL CA: CPT | Performed by: FAMILY MEDICINE

## 2021-01-12 PROCEDURE — 99202 OFFICE O/P NEW SF 15 MIN: CPT | Performed by: FAMILY MEDICINE

## 2021-01-12 PROCEDURE — 83735 ASSAY OF MAGNESIUM: CPT | Performed by: FAMILY MEDICINE

## 2021-01-12 PROCEDURE — 36415 COLL VENOUS BLD VENIPUNCTURE: CPT | Performed by: FAMILY MEDICINE

## 2021-01-12 NOTE — PROGRESS NOTES
Subjective   Mami Garcia is a 40 y.o. female.  Emergency room follow-up.  Was seen in emergency room on Mikhail Day for nausea dizziness found to have a low magnesium.  Was given IV fluids felt better had some mild acrocyanosis but all went away.  Currently feels well.  Past history significant for .  He is a former smoker denies alcohol or drug use.  Some family history of metabolic syndrome.  Is behind on visiting with gynecology for well woman exams.  History and sidebar are generated.    History of Present Illness     The following portions of the patient's history were reviewed and updated as appropriate: allergies, current medications, past family history, past medical history, past social history, past surgical history and problem list.    Review of Systems   All other systems reviewed and are negative.      Objective   Physical Exam  Constitutional:       Appearance: Normal appearance. She is normal weight.   Cardiovascular:      Rate and Rhythm: Normal rate.   Pulmonary:      Effort: Pulmonary effort is normal.   Abdominal:      General: Abdomen is flat.   Musculoskeletal: Normal range of motion.   Neurological:      Mental Status: She is alert.   Psychiatric:         Mood and Affect: Mood normal.         Behavior: Behavior normal.         Thought Content: Thought content normal.         Judgment: Judgment normal.           Assessment/Plan   Diagnoses and all orders for this visit:    1. Hypomagnesemia (Primary)  -     Magnesium  -     Basic Metabolic Panel    2. Encounter for hepatitis C screening test for low risk patient  -     Hepatitis C Antibody    3. Well woman exam  -     Ambulatory Referral to Obstetrics / Gynecology        Counseled on lifestyle measures counseled on Covid vaccine when available declines flu vaccine.  Orders as above recheck as needed

## 2021-01-13 LAB
ANION GAP SERPL CALCULATED.3IONS-SCNC: 7.8 MMOL/L (ref 5–15)
BUN SERPL-MCNC: 14 MG/DL (ref 6–20)
BUN/CREAT SERPL: 20.6 (ref 7–25)
CALCIUM SPEC-SCNC: 9.5 MG/DL (ref 8.6–10.5)
CHLORIDE SERPL-SCNC: 104 MMOL/L (ref 98–107)
CO2 SERPL-SCNC: 28.2 MMOL/L (ref 22–29)
CREAT SERPL-MCNC: 0.68 MG/DL (ref 0.57–1)
GFR SERPL CREATININE-BSD FRML MDRD: 95 ML/MIN/1.73
GLUCOSE SERPL-MCNC: 77 MG/DL (ref 65–99)
HCV AB SER DONR QL: NORMAL
MAGNESIUM SERPL-MCNC: 2.3 MG/DL (ref 1.6–2.6)
POTASSIUM SERPL-SCNC: 4 MMOL/L (ref 3.5–5.2)
SODIUM SERPL-SCNC: 140 MMOL/L (ref 136–145)

## 2021-10-13 ENCOUNTER — OFFICE VISIT (OUTPATIENT)
Dept: OBSTETRICS AND GYNECOLOGY | Facility: CLINIC | Age: 41
End: 2021-10-13

## 2021-10-13 VITALS
BODY MASS INDEX: 23.54 KG/M2 | HEIGHT: 67 IN | WEIGHT: 150 LBS | SYSTOLIC BLOOD PRESSURE: 104 MMHG | DIASTOLIC BLOOD PRESSURE: 70 MMHG

## 2021-10-13 DIAGNOSIS — Z12.31 SCREENING MAMMOGRAM, ENCOUNTER FOR: ICD-10-CM

## 2021-10-13 DIAGNOSIS — Z12.4 ENCOUNTER FOR PAPANICOLAOU SMEAR FOR CERVICAL CANCER SCREENING: ICD-10-CM

## 2021-10-13 DIAGNOSIS — Z01.419 WELL WOMAN EXAM WITH ROUTINE GYNECOLOGICAL EXAM: Primary | ICD-10-CM

## 2021-10-13 DIAGNOSIS — R35.0 URINARY FREQUENCY: ICD-10-CM

## 2021-10-13 PROCEDURE — 81001 URINALYSIS AUTO W/SCOPE: CPT | Performed by: NURSE PRACTITIONER

## 2021-10-13 PROCEDURE — 87086 URINE CULTURE/COLONY COUNT: CPT | Performed by: NURSE PRACTITIONER

## 2021-10-13 PROCEDURE — 99396 PREV VISIT EST AGE 40-64: CPT | Performed by: NURSE PRACTITIONER

## 2021-10-13 NOTE — PROGRESS NOTES
My Depression Action Plan  Name: Leela Dudley   Date of Birth 9/14/1927  Date: 1/9/2018    My doctor: Ronnie Arora   My clinic: [address] [id number] [phone]          GREEN    ZONE   Good Control    What it looks like:     Things are going generally well. You have normal up s and down s. You may even feel depressed from time to time, but bad moods usually last less than a day.   What you need to do:  1. Continue to care for yourself (see self care plan)  2. Check your depression survival kit and update it as needed  3. Follow your physician s recommendations including any medication.  4. Do not stop taking medication unless you consult with your physician first.           YELLOW         ZONE Getting Worse    What it looks like:     Depression is starting to interfere with your life.     It may be hard to get out of bed; you may be starting to isolate yourself from others.    Symptoms of depression are starting to last most all day and this has happened for several days.     You may have suicidal thoughts but they are not constant.   What you need to do:     1. Call your care team, your response to treatment will improve if you keep your care team informed of your progress. Yellow periods are signs an adjustment may need to be made.     2. Continue your self-care, even if you have to fake it!    3. Talk to someone in your support network    4. Open up your depression survival kit           RED    ZONE Medical Alert - Get Help    What it looks like:     Depression is seriously interfering with your life.     You may experience these or other symptoms: You can t get out of bed most days, can t work or engage in other necessary activities, you have trouble taking care of basic hygiene, or basic responsibilities, thoughts of suicide or death that will not go away, self-injurious behavior.     What you need to do:  1. Call your  Subjective   Mami Garcia is a 41 y.o. here for gyn annual    LMP: 10/9  BC: tubal ligation  Pap: 2012, NIL in Epic    Maim Garcia is a 41 yr old  who prsents today for her gyn annual. C/o of urinary frequency; wants urine to be checked. Has had some hot flashes and mood swings, wondering if she may be perimenopausal. Periods usually occur once a month but at times feels they occur twice a month. Bleeding last for 5-6 days. Declines STI screening. Had a mammogram apx 2 months ago with a mobile clinic at [facility] and it was normal.     Gynecologic Exam  The patient's pertinent negatives include no genital itching, genital lesions, genital odor, genital rash, missed menses, pelvic pain, vaginal bleeding or vaginal discharge. Associated symptoms include frequency. Pertinent negatives include no abdominal pain, chills, constipation, diarrhea, dysuria, fever, nausea, rash or sore throat. Her menstrual history has been regular.       The following portions of the patient's history were reviewed and updated as appropriate: allergies, current medications, past family history, past medical history, past social history, past surgical history and problem list.    Review of Systems   Constitutional: Negative for chills, fatigue, fever, unexpected weight gain and unexpected weight loss.   HENT: Negative for sneezing and sore throat.    Respiratory: Negative for shortness of breath.    Cardiovascular: Negative for chest pain and palpitations.   Gastrointestinal: Negative for abdominal pain, constipation, diarrhea and nausea.   Endocrine: Positive for heat intolerance. Negative for cold intolerance.   Genitourinary: Positive for frequency. Negative for amenorrhea, breast discharge, breast lump, breast pain, difficulty urinating, dysuria, menstrual problem, missed menses, pelvic pain, pelvic pressure, urinary incontinence, vaginal bleeding, vaginal discharge and vaginal pain.   Skin: Negative for rash.    Neurological: Negative for weakness and headache.   Psychiatric/Behavioral: Negative for sleep disturbance, depressed mood and stress.        Mood swings       Objective   Physical Exam  Vitals and nursing note reviewed. Exam conducted with a chaperone present.   Constitutional:       Appearance: She is well-developed.   HENT:      Head: Normocephalic and atraumatic.   Eyes:      Conjunctiva/sclera: Conjunctivae normal.   Neck:      Thyroid: No thyromegaly.   Cardiovascular:      Rate and Rhythm: Normal rate and regular rhythm.      Heart sounds: Normal heart sounds.   Pulmonary:      Effort: Pulmonary effort is normal. No respiratory distress.      Breath sounds: Normal breath sounds.   Chest:   Breasts:      Right: Normal.      Left: Normal.       Abdominal:      General: Bowel sounds are normal. There is no distension.      Palpations: Abdomen is soft.      Tenderness: There is no abdominal tenderness.   Genitourinary:     General: Normal vulva.      Labia:         Right: No rash, tenderness, lesion or injury.         Left: No rash, tenderness, lesion or injury.       Vagina: Normal.      Cervix: Normal.      Uterus: Normal.       Adnexa: Right adnexa normal and left adnexa normal.      Rectum: Normal.      Comments: Pap cotesting collected.   Musculoskeletal:         General: Normal range of motion.      Cervical back: Normal range of motion and neck supple.   Skin:     General: Skin is warm and dry.   Neurological:      Mental Status: She is alert and oriented to person, place, and time.   Psychiatric:         Behavior: Behavior normal.           Assessment/Plan   Diagnoses and all orders for this visit:    1. Well woman exam with routine gynecological exam (Primary)    2. Encounter for Papanicolaou smear for cervical cancer screening  -     Liquid-based Pap Smear, Screening    3. Screening mammogram, encounter for  -     Mammo Screening Digital Tomosynthesis Bilateral With CAD; Future    4. Urinary  care team and request a same-day appointment. If they are not available (weekends or after hours) call your local crisis line, emergency room or 911.      Electronically signed by: Nikki Ogden, January 9, 2018    Depression Self Care Plan / Survival Kit    Self-Care for Depression  Here s the deal. Your body and mind are really not as separate as most people think.  What you do and think affects how you feel and how you feel influences what you do and think. This means if you do things that people who feel good do, it will help you feel better.  Sometimes this is all it takes.  There is also a place for medication and therapy depending on how severe your depression is, so be sure to consult with your medical provider and/ or Behavioral Health Consultant if your symptoms are worsening or not improving.     In order to better manage my stress, I will:    Exercise  Get some form of exercise, every day. This will help reduce pain and release endorphins, the  feel good  chemicals in your brain. This is almost as good as taking antidepressants!  This is not the same as joining a gym and then never going! (they count on that by the way ) It can be as simple as just going for a walk or doing some gardening, anything that will get you moving.      Hygiene   Maintain good hygiene (Get out of bed in the morning, Make your bed, Brush your teeth, Take a shower, and Get dressed like you were going to work, even if you are unemployed).  If your clothes don't fit try to get ones that do.    Diet  I will strive to eat foods that are good for me, drink plenty of water, and avoid excessive sugar, caffeine, alcohol, and other mood-altering substances.  Some foods that are helpful in depression are: complex carbohydrates, B vitamins, flaxseed, fish or fish oil, fresh fruits and vegetables.    Psychotherapy  I agree to participate in Individual Therapy (if recommended).    Medication  If prescribed medications, I agree to take  frequency  -     Urinalysis With Microscopic - Urine, Clean Catch; Future  -     Urine Culture - Urine, Urine, Clean Catch  -     Urinalysis With Microscopic - Urine, Clean Catch          Reviewed pap smear screening guidelines, breast awareness, and annual mammograms.  Encouraged regular exercise. Reviewed perimenopause symptoms; counseled to monitor periods. Return in one year or as needed.       them.  Missing doses can result in serious side effects.  I understand that drinking alcohol, or other illicit drug use, may cause potential side effects.  I will not stop my medication abruptly without first discussing it with my provider.    Staying Connected With Others  I will stay in touch with my friends, family members, and my primary care provider/team.    Use your imagination  Be creative.  We all have a creative side; it doesn t matter if it s oil painting, sand castles, or mud pies! This will also kick up the endorphins.    Witness Beauty  (AKA stop and smell the roses) Take a look outside, even in mid-winter. Notice colors, textures. Watch the squirrels and birds.     Service to others  Be of service to others.  There is always someone else in need.  By helping others we can  get out of ourselves  and remember the really important things.  This also provides opportunities for practicing all the other parts of the program.    Humor  Laugh and be silly!  Adjust your TV habits for less news and crime-drama and more comedy.    Control your stress  Try breathing deep, massage therapy, biofeedback, and meditation. Find time to relax each day.     My support system    Clinic Contact:  Phone number:    Contact 1:  Phone number:    Contact 2:  Phone number:    Mu-ism/:  Phone number:    Therapist:  Phone number:    Local crisis center:    Phone number:    Other community support:  Phone number:

## 2021-10-14 LAB
BACTERIA SPEC AEROBE CULT: NORMAL
BACTERIA UR QL AUTO: ABNORMAL /HPF
BILIRUB UR QL STRIP: NEGATIVE
CLARITY UR: CLEAR
COLOR UR: YELLOW
GLUCOSE UR STRIP-MCNC: NEGATIVE MG/DL
HGB UR QL STRIP.AUTO: NEGATIVE
HYALINE CASTS UR QL AUTO: ABNORMAL /LPF
KETONES UR QL STRIP: NEGATIVE
LEUKOCYTE ESTERASE UR QL STRIP.AUTO: NEGATIVE
NITRITE UR QL STRIP: NEGATIVE
PH UR STRIP.AUTO: 7 [PH] (ref 5–8)
PROT UR QL STRIP: NEGATIVE
RBC # UR: ABNORMAL /HPF
REF LAB TEST METHOD: ABNORMAL
SP GR UR STRIP: 1.03 (ref 1–1.03)
SQUAMOUS #/AREA URNS HPF: ABNORMAL /HPF
UROBILINOGEN UR QL STRIP: NORMAL
WBC UR QL AUTO: ABNORMAL /HPF

## 2021-10-18 LAB
LAB AP CASE REPORT: NORMAL
PATH INTERP SPEC-IMP: NORMAL

## 2022-01-14 ENCOUNTER — APPOINTMENT (OUTPATIENT)
Dept: GENERAL RADIOLOGY | Facility: HOSPITAL | Age: 42
End: 2022-01-14

## 2022-01-14 ENCOUNTER — HOSPITAL ENCOUNTER (EMERGENCY)
Facility: HOSPITAL | Age: 42
Discharge: HOME | End: 2022-01-14
Attending: EMERGENCY MEDICINE | Admitting: EMERGENCY MEDICINE

## 2022-01-14 VITALS
HEIGHT: 67 IN | SYSTOLIC BLOOD PRESSURE: 114 MMHG | DIASTOLIC BLOOD PRESSURE: 56 MMHG | OXYGEN SATURATION: 98 % | TEMPERATURE: 97.4 F | WEIGHT: 152 LBS | RESPIRATION RATE: 18 BRPM | HEART RATE: 94 BPM | BODY MASS INDEX: 23.86 KG/M2

## 2022-01-14 DIAGNOSIS — S82.225A CLOSED NONDISPLACED TRANSVERSE FRACTURE OF SHAFT OF LEFT TIBIA, INITIAL ENCOUNTER: Primary | ICD-10-CM

## 2022-01-14 PROCEDURE — 73590 X-RAY EXAM OF LOWER LEG: CPT

## 2022-01-14 PROCEDURE — 99283 EMERGENCY DEPT VISIT LOW MDM: CPT

## 2022-01-14 PROCEDURE — 73610 X-RAY EXAM OF ANKLE: CPT

## 2022-01-14 PROCEDURE — 73630 X-RAY EXAM OF FOOT: CPT

## 2022-01-14 PROCEDURE — 99284 EMERGENCY DEPT VISIT MOD MDM: CPT

## 2022-01-14 RX ORDER — IBUPROFEN 800 MG/1
800 TABLET ORAL ONCE
Status: COMPLETED | OUTPATIENT
Start: 2022-01-14 | End: 2022-01-14

## 2022-01-14 RX ORDER — IBUPROFEN 800 MG/1
800 TABLET ORAL EVERY 8 HOURS PRN
Qty: 30 TABLET | Refills: 0 | Status: SHIPPED | OUTPATIENT
Start: 2022-01-14 | End: 2022-03-02

## 2022-01-14 RX ORDER — OXYCODONE HYDROCHLORIDE AND ACETAMINOPHEN 5; 325 MG/1; MG/1
1 TABLET ORAL ONCE
Status: DISCONTINUED | OUTPATIENT
Start: 2022-01-14 | End: 2022-01-15 | Stop reason: HOSPADM

## 2022-01-14 RX ORDER — OXYCODONE HYDROCHLORIDE AND ACETAMINOPHEN 5; 325 MG/1; MG/1
1 TABLET ORAL EVERY 6 HOURS PRN
Qty: 15 TABLET | Refills: 0 | Status: SHIPPED | OUTPATIENT
Start: 2022-01-14 | End: 2022-03-02

## 2022-01-14 RX ADMIN — IBUPROFEN 800 MG: 800 TABLET, FILM COATED ORAL at 21:55
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
## 2022-01-15 NOTE — DISCHARGE INSTRUCTIONS
Do not bear weight on the leg.  Walk with crutches.  Followup with orthopedics early next week.  Return with any new or worsening symptoms, or any concerns.

## 2022-01-17 ENCOUNTER — OFFICE VISIT (OUTPATIENT)
Dept: ORTHOPEDIC SURGERY | Facility: CLINIC | Age: 42
End: 2022-01-17

## 2022-01-17 VITALS — HEIGHT: 67 IN | BODY MASS INDEX: 23.86 KG/M2 | WEIGHT: 152 LBS

## 2022-01-17 DIAGNOSIS — M79.662 PAIN OF LEFT LOWER LEG: Primary | ICD-10-CM

## 2022-01-17 PROCEDURE — 99203 OFFICE O/P NEW LOW 30 MIN: CPT | Performed by: ORTHOPAEDIC SURGERY
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
## 2022-02-11 DIAGNOSIS — M79.662 PAIN OF LEFT LOWER LEG: Primary | ICD-10-CM

## 2022-02-15 ENCOUNTER — OFFICE VISIT (OUTPATIENT)
Dept: ORTHOPEDIC SURGERY | Facility: CLINIC | Age: 42
End: 2022-02-15

## 2022-02-15 VITALS — WEIGHT: 152 LBS | HEIGHT: 67 IN | BODY MASS INDEX: 23.86 KG/M2

## 2022-02-15 DIAGNOSIS — S82.302A NONDISPLACED FRACTURE OF DISTAL END OF LEFT TIBIA: Primary | ICD-10-CM

## 2022-02-15 DIAGNOSIS — S82.302A NONDISPLACED FRACTURE OF DISTAL END OF LEFT TIBIA: ICD-10-CM

## 2022-02-15 DIAGNOSIS — M79.662 PAIN OF LEFT LOWER LEG: Primary | ICD-10-CM

## 2022-02-15 PROCEDURE — 99212 OFFICE O/P EST SF 10 MIN: CPT | Performed by: ORTHOPAEDIC SURGERY

## 2022-02-15 NOTE — PROGRESS NOTES
"Mami Garcia is a 42 y.o. female returns for     Chief Complaint   Patient presents with   • Left Tibia - Follow-up   • Cast Removal     with x-rays      X-rays done today in cast   HISTORY OF PRESENT ILLNESS:    42 F 4 weeks s/p non-displaced L distal-third tibia shaft fx returns for routine f/u and says that she is doing well.  Pt has been managed conservatively in LLC.  She denies pain.     CONCURRENT MEDICAL HISTORY:    The following portions of the patient's history were reviewed and updated as appropriate: allergies, current medications, past family history, past medical history, past social history, past surgical history and problem list.     ROS  No fevers or chills.  No chest pain or shortness of air.  No GI or  disturbances.    PHYSICAL EXAMINATION:       Ht 170.2 cm (67\")   Wt 68.9 kg (152 lb)   BMI 23.81 kg/m²     Physical Exam    GAIT:     []  Normal  []  Antalgic    Assistive device: []  None  []  Walker     []  Crutches  []  Cane     [x]  Wheelchair  []  Stretcher    Ortho Exam  NAD  LLE:  LLC in place, c/d/i.  Skin intact at prox and distal ends of the cast.  No pain with percussion over the cast.  DNVI.    XR Tibia Fibula 2 View Left    Result Date: 1/17/2022  Narrative: Two view left leg HISTORY: Cast placement AP and lateral views obtained. COMPARISON: 2:02 PM FINDINGS: Cast now in place. Nondisplaced transverse and oblique fracture distal tibial diaphysis. No dislocation. No other osseous or articular abnormality.     Impression: CONCLUSION: Cast now in place. Nondisplaced transverse and oblique fracture distal tibial diaphysis. [id number] Electronically signed by:  Sandro Parikh MD  1/17/2022 6:57 PM [facility] Workstation: [id number]    XR Tibia Fibula 2 View Left    Result Date: 1/17/2022  Narrative: Two view left leg HISTORY: Pain. Fracture follow-up. AP and lateral views obtained. COMPARISON: January 14, 2022. FINDINGS: Ventral splint now in place. Mild diastases of the transverse and oblique " fracture distal tibial diaphysis. No dislocation. No fracture or dislocation. No other osseous or articular abnormality.     Impression: CONCLUSION: Ventral splint now in place. Mild diastases of the transverse and oblique fracture distal tibial diaphysis. [id number] Electronically signed by:  Sandro Parikh MD  1/17/2022 6:56 PM [facility] Workstation: [id number]            ASSESSMENT:    Diagnoses and all orders for this visit:    Pain of left lower leg    Nondisplaced fracture of distal end of left tibia          PLAN  42 F 4 week s/p non-displaced L distal-third tibial shaft fx.  Will transition to patellar tendon bearing orthosis.  We expect that the fracture will ultimately heal with continued conservative mgmt in the PTB orthosis.  May begin PWB 50% body weight the brace is applied.  RTC 4 weeks with new XRs.  No follow-ups on file.    Nitza Corado, CSA

## 2022-03-01 DIAGNOSIS — S82.302A NONDISPLACED FRACTURE OF DISTAL END OF LEFT TIBIA: Primary | ICD-10-CM

## 2022-03-02 ENCOUNTER — OFFICE VISIT (OUTPATIENT)
Dept: ORTHOPEDIC SURGERY | Facility: CLINIC | Age: 42
End: 2022-03-02

## 2022-03-02 VITALS — HEIGHT: 67 IN | BODY MASS INDEX: 23.86 KG/M2 | WEIGHT: 152 LBS

## 2022-03-02 DIAGNOSIS — M79.662 PAIN OF LEFT LOWER LEG: Primary | ICD-10-CM

## 2022-03-02 PROCEDURE — 99212 OFFICE O/P EST SF 10 MIN: CPT | Performed by: ORTHOPAEDIC SURGERY

## 2022-03-02 NOTE — PROGRESS NOTES
"Mami Garcia is a 42 y.o. female returns for     Chief Complaint   Patient presents with   • Follow-up     Left lower leg       HISTORY OF PRESENT ILLNESS:    42 F 6 weeks s/p non-displaced L distal-third tibial shaft fx returns for f/u earlier than her scheduled appt in order to have her cast removed so that she can be fitted for a PTB orthosis.  Denies pain.  Has been immobilized in LLC.    CONCURRENT MEDICAL HISTORY:    The following portions of the patient's history were reviewed and updated as appropriate: allergies, current medications, past family history, past medical history, past social history, past surgical history and problem list.     ROS  No fevers or chills.  No chest pain or shortness of air.  No GI or  disturbances.    PHYSICAL EXAMINATION:       Ht 170.2 cm (67\")   Wt 68.9 kg (152 lb)   BMI 23.81 kg/m²     Physical Exam    GAIT:     []  Normal  []  Antalgic    Assistive device: []  None  []  Walker     []  Crutches  []  Cane     []  Wheelchair  []  Stretcher    Ortho Exam  NAD  LLE:  LLC in place, c/d/i.  No pain with percussion over the cast.  DNVI.    XR Tibia Fibula 2 View Left    Result Date: 2/15/2022  Narrative: Two view left leg HISTORY: Pain. Fracture follow-up. AP and lateral views obtained. COMPARISON: Most recent January 17, 2022 FINDINGS: Cast remains in place. Again there is a nondisplaced transverse and longitudinal fracture distal tibial diaphysis. There is approximately 2.6 cm of superior extension of the fracture, not demonstrated on prior studies. Minimal callus formation present laterally. No dislocation. No other osseous or articular abnormality.     Impression: CONCLUSION: Cast remains in place. Again there is a nondisplaced transverse and longitudinal fracture distal tibial diaphysis. There is approximately 2.6 cm of superior extension of the fracture, not demonstrated on prior studies. [id number] Electronically signed by:  Sandro Parikh MD  2/15/2022 5:09 PM CST " Workstation: [id number]            ASSESSMENT:    Diagnoses and all orders for this visit:    Pain of left lower leg          PLAN  42 F 6 weeks s/p non-displaced L distal-third tibial shaft fx.  Doing well.  XRs show interval healing without interval displacement of the fx.  Will transition to PTB orthosis.  Once PTB orthosis applied, pt may be WBAT.  If the orthosis will not be ready today, the LLC will be reapplied and secured with an ACE bandage.  She must remain NWB in the LLC until the PTB orthosis is ready.  Pt requires a custom orthosis that is fitted to her leg to prevent displacement of the fracture.  She will likely need to remain in the orthosis for 6 more weeks until the fx has healed.  RTC 6 weeks with new XRs OOC.  No follow-ups on file.    Roxi Diana MA

## 2022-03-08 ENCOUNTER — TELEPHONE (OUTPATIENT)
Dept: ORTHOPEDIC SURGERY | Facility: CLINIC | Age: 42
End: 2022-03-08

## 2022-03-28 ENCOUNTER — OFFICE VISIT (OUTPATIENT)
Dept: ORTHOPEDIC SURGERY | Facility: CLINIC | Age: 42
End: 2022-03-28

## 2022-03-28 VITALS — WEIGHT: 146 LBS | BODY MASS INDEX: 22.91 KG/M2 | HEIGHT: 67 IN

## 2022-03-28 DIAGNOSIS — S82.302A NONDISPLACED FRACTURE OF DISTAL END OF LEFT TIBIA: Primary | ICD-10-CM

## 2022-03-28 PROCEDURE — 99212 OFFICE O/P EST SF 10 MIN: CPT | Performed by: ORTHOPAEDIC SURGERY

## 2022-03-28 NOTE — PROGRESS NOTES
"Mami Garcia is a 42 y.o. female returns for     Chief Complaint   Patient presents with   • Left Tibia - Follow-up, Pain     X-rays done today in office    HISTORY OF PRESENT ILLNESS:    42 F 10 weeks s/p non-displaced L distal-third tibial shaft fx returns for routine f/u and says that she is doing well.  Pt has been managed conservatively.  Last seen 3/2/2022.  At that time, he fx appeared well-aligned in a LLC.  She was transitioned 6 days ago to a removable PTB orthosis.       CONCURRENT MEDICAL HISTORY:    The following portions of the patient's history were reviewed and updated as appropriate: allergies, current medications, past family history, past medical history, past social history, past surgical history and problem list.     ROS  No fevers or chills.  No chest pain or shortness of air.  No GI or  disturbances.    PHYSICAL EXAMINATION:       Ht 170.2 cm (67\")   Wt 66.2 kg (146 lb)   BMI 22.87 kg/m²     Physical Exam    GAIT:     []  Normal  []  Antalgic    Assistive device: []  None  []  Walker     [x]  Crutches  []  Cane     []  Wheelchair  []  Stretcher    Ortho Exam  NAD  LLE:  PTB orthosis in place.  No TTP over fx site.  Able to DF/PF ankle.  Decr sens around the fx site.    XR Tibia Fibula 2 View Left    Result Date: 3/3/2022  Narrative: Two view left leg HISTORY: Fracture follow-up AP and lateral views obtained. COMPARISON: February 15, 2022 FINDINGS: Cast remains in place. Healing fracture distal tibial diaphysis. No change in alignment or apposition of fracture fragments. No dislocation. No other osseous or articular abnormality.     Impression: CONCLUSION: Cast remains in place. Healing fracture distal tibial diaphysis. [id number] Electronically signed by:  Sandro Parikh MD  3/3/2022 11:07 AM [facility] Workstation: [id number]            ASSESSMENT:    Diagnoses and all orders for this visit:    Nondisplaced fracture of distal end of left tibia          PLAN  42 F 10 weeks s/p non-displaced L " distal-third tibial shaft fx.  Doing well.  XRs show interval healing without interval displacement of the fx.  There has been some interval resorption of bone at the fx site.  Con't PTB orthosis.  WBAT LLE in brace at all times except for bathing.  Given a note allowing her to RTW for light duty as long as she doesn't have to carry anything or go up/down stairs more than once or twice a day.  No restrictions on sitting, standing, or walking.  RTC 6 weeks with new XRs.  No follow-ups on file.    Wm Cedeno MD

## 2022-05-10 ENCOUNTER — OFFICE VISIT (OUTPATIENT)
Dept: ORTHOPEDIC SURGERY | Facility: CLINIC | Age: 42
End: 2022-05-10

## 2022-05-10 VITALS — HEIGHT: 67 IN | HEART RATE: 83 BPM | WEIGHT: 146 LBS | OXYGEN SATURATION: 98 % | BODY MASS INDEX: 22.91 KG/M2

## 2022-05-10 DIAGNOSIS — S82.302A NONDISPLACED FRACTURE OF DISTAL END OF LEFT TIBIA: Primary | ICD-10-CM

## 2022-05-10 PROCEDURE — 99212 OFFICE O/P EST SF 10 MIN: CPT | Performed by: ORTHOPAEDIC SURGERY

## 2022-05-10 NOTE — PROGRESS NOTES
Mami Garcia is a 42 y.o. female returns for     Chief Complaint   Patient presents with   • Left Tibia - Follow-up     X-rays done today in office  HISTORY OF PRESENT ILLNESS:    42 F 4 months s/p non-displaced L distal-third tibial shaft fx returns for routine f/u and says that she is doing well.  Pt has been managed conservatively.  Last seen in clinic 3/28/2022.  She had been transitioned to a removable PTB orthosis 6 days prior.  Today, she denies significant pain.  She has been wearing the PTB orthosis, but removes it to sleep and when she's going to bathroom.    CONCURRENT MEDICAL HISTORY:    The following portions of the patient's history were reviewed and updated as appropriate: allergies, current medications, past family history, past medical history, past social history, past surgical history and problem list.     ROS  No fevers or chills.  No chest pain or shortness of air.  No GI or  disturbances.    PHYSICAL EXAMINATION:       There were no vitals taken for this visit.    Physical Exam    GAIT:     [x]  Normal  []  Antalgic    Assistive device: []  None  []  Walker     []  Crutches  []  Cane     []  Wheelchair  []  Stretcher    Ortho Exam  NAD  LLE:  PTB orthosis in place.  +TTP over distal tibia.    No results found.          ASSESSMENT:    Diagnoses and all orders for this visit:    Nondisplaced fracture of distal end of left tibia  -     XR Tibia Fibula 2 View Left          PLAN  42 F 4 months s/p non-displaced L distal-third tibial shaft fx.  Doing well.  XRs show interval healing with new bone forming around the fx site.  The fx site is also filling in with new bone; however, there appears to be a fx line through the new bone on the lateral view.  Rec con't conservative mgmt.  Maintain PTB orthosis.  WBAT LLE.  RTC 2 months with new XRs of L tib-fib.  No follow-ups on file.    Nitza Corado, CSA

## 2022-07-11 ENCOUNTER — OFFICE VISIT (OUTPATIENT)
Dept: ORTHOPEDIC SURGERY | Facility: CLINIC | Age: 42
End: 2022-07-11

## 2022-07-11 VITALS — BODY MASS INDEX: 22.91 KG/M2 | WEIGHT: 146 LBS | HEIGHT: 67 IN

## 2022-07-11 DIAGNOSIS — S82.302A NONDISPLACED FRACTURE OF DISTAL END OF LEFT TIBIA: Primary | ICD-10-CM

## 2022-07-11 DIAGNOSIS — S82.302K: ICD-10-CM

## 2022-07-11 PROCEDURE — 99212 OFFICE O/P EST SF 10 MIN: CPT | Performed by: ORTHOPAEDIC SURGERY

## 2022-07-11 NOTE — PROGRESS NOTES
"Mami Garcia is a 42 y.o. female returns for     Chief Complaint   Patient presents with   • Left Tibia - Follow-up     X-rays done today in office   HISTORY OF PRESENT ILLNESS:    42 F 6 months s/p non-displaced L distal-third tibial shaft fx returns for routine f/u and says that she is doing well.  Still has occ discomfort in the leg.  Has been wearing the PTB orthosis.     CONCURRENT MEDICAL HISTORY:    The following portions of the patient's history were reviewed and updated as appropriate: allergies, current medications, past family history, past medical history, past social history, past surgical history and problem list.     ROS  No fevers or chills.  No chest pain or shortness of air.  No GI or  disturbances.    PHYSICAL EXAMINATION:       Ht 170.2 cm (67\")   Wt 66.2 kg (146 lb)   BMI 22.87 kg/m²     Physical Exam    GAIT:     [x]  Normal  []  Antalgic    Assistive device: []  None  []  Walker     []  Crutches  []  Cane     []  Wheelchair  [x]  Tibial brace    Ortho Exam  NAD  LLE:  PTB orthosis in place.  Remained of exam deferred.    No results found.          ASSESSMENT:    Diagnoses and all orders for this visit:    Nondisplaced fracture of distal end of left tibia          PLAN  42 F 6 months s/p non-displaced L distal-third tibial shaft fx.  Doing well.  XRs show interval healing, but there is persistence of the fx line.  This is taking longer to completely heal than I would have expected.  This is likely due to her smoking history.  Encourage more WB activity on the LLE.  She should continue wearing the PTB orthosis while at work, but should remove the brace when not working so that she is bearing more weight through the leg.  Will also try to get her a bone stimulator.  RTC 3 months with new XRs of L tib-fib.    No follow-ups on file.    Nitza Corado CSA  "

## 2022-07-12 DIAGNOSIS — S82.302A NONDISPLACED FRACTURE OF DISTAL END OF LEFT TIBIA: Primary | ICD-10-CM

## 2022-08-10 ENCOUNTER — TELEPHONE (OUTPATIENT)
Dept: ORTHOPEDIC SURGERY | Facility: CLINIC | Age: 42
End: 2022-08-10

## 2022-08-10 NOTE — TELEPHONE ENCOUNTER
FRENCH Espinal from [employer] contacted me via email to let me know that the patient has an out of pocket of $558.51 and she wanted to discuss with her  before proceeding with the stimulator.   They have attempted to contact her since but have not been able to connect with her yet.  I wanted to let you know in case she is seen in office before they manage to talk with her.

## 2022-10-12 DIAGNOSIS — M79.662 PAIN OF LEFT LOWER LEG: ICD-10-CM

## 2022-10-12 DIAGNOSIS — S82.302K: ICD-10-CM

## 2022-10-12 DIAGNOSIS — S82.302A NONDISPLACED FRACTURE OF DISTAL END OF LEFT TIBIA: Primary | ICD-10-CM

## 2022-10-17 ENCOUNTER — OFFICE VISIT (OUTPATIENT)
Dept: ORTHOPEDIC SURGERY | Facility: CLINIC | Age: 42
End: 2022-10-17

## 2022-10-17 VITALS — HEIGHT: 67 IN | WEIGHT: 151 LBS | BODY MASS INDEX: 23.7 KG/M2

## 2022-10-17 DIAGNOSIS — S82.302K: ICD-10-CM

## 2022-10-17 DIAGNOSIS — S82.302A NONDISPLACED FRACTURE OF DISTAL END OF LEFT TIBIA: Primary | ICD-10-CM

## 2022-10-17 PROCEDURE — 99213 OFFICE O/P EST LOW 20 MIN: CPT | Performed by: NURSE PRACTITIONER

## 2022-10-17 NOTE — PROGRESS NOTES
"Mami Garcia is a 42 y.o. female returns for     Chief Complaint   Patient presents with   • Left Fibula - Follow-up   • Left Tibia - Follow-up       HISTORY OF PRESENT ILLNESS:    Mrs. Garcia is a 39 female who presents today to follow-up on left tibia fracture which occurred approximately 9 months ago.  She was being followed by Dr. Cedeno in this office previously.  She has occasional discomfort in left leg, she reports this is in her lateral leg and seems to be resolving.  She has no consistent pain.  She reports wearing PTB brace while working but does not wear brace at any other time.  Patient reports her pain is a 0 out of 10.  She has no unusual complaints.  She has not been to x-rays.  She is a smoker.  She reports never using bone stimulator due to cost.         CONCURRENT MEDICAL HISTORY:    The following portions of the patient's history were reviewed and updated as appropriate: allergies, current medications, past family history, past medical history, past social history, past surgical history and problem list.     ROS  No fevers or chills.  No chest pain or shortness of air.  No GI or  disturbances.  No Ortho complaints today.    PHYSICAL EXAMINATION:       Ht 170.2 cm (67\")   Wt 68.5 kg (151 lb)   BMI 23.65 kg/m²     Physical Exam  Vitals and nursing note reviewed.   Constitutional:       General: She is not in acute distress.     Appearance: She is well-developed. She is not toxic-appearing.   HENT:      Head: Normocephalic.   Pulmonary:      Effort: Pulmonary effort is normal. No respiratory distress.   Skin:     General: Skin is warm and dry.   Neurological:      Mental Status: She is alert and oriented to person, place, and time.   Psychiatric:         Behavior: Behavior normal.         Thought Content: Thought content normal.         Judgment: Judgment normal.         GAIT:     []  Normal  []  Antalgic    Assistive device: []  None  []  Walker     []  Crutches  []  Cane     []  " Wheelchair  []  Stretcher    Ortho Exam    No bony tenderness of the tibia.  Patient has some mild tenderness with palpation of lateral leg overlying the fibula.  There is no evidence of infection.  Distal pulses are intact.  No pain with range of motion of knee or ankle.      No results found.          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  Recommend repeating x-rays in 3 months, anticipate this will be the last x-ray needed.  Patient is to start tapering out of PTB brace while at work.  Patient is to still use PTB brace for most of her workday, but may start coming out of brace for increasingly longer periods of time until no longer wearing brace at all.  Patient instructed to return to brace if she has pain.  Importance of not pushing past pain explained.  Patient instructed to avoid all high-impact activity at this time.  Patient verbalized understanding of instructions.  In regards to mild pain of lateral leg, patient offered ultrasound/MRI for further investigation.  Patient does not have any evidence of infection or blood clots today.  After considering her options patient does not opt to proceed with any further work-up at this time as the occasional pain in her lateral leg seems to be improving with time.    Return in about 3 months (around 1/17/2023) for Repeat x-rays.    EMR Dragon/Transciption Disclaimer: Some of this note may be an electronic transcription/translation of spoken language to printed text.  The electronic translation of spoken language may permit erroneous, or at times, nonsensical words or phrases to be inadvertently transcribed. Although I have reviewed the note for such errors, some may still exist.     Time spent  of a minimum of 20 minutes including the face to face evaluation, reviewing of medical history and prior medial records, reviewing of diagnostic studies, ordering additional tests, documentation, patient education and coordination of care.           This document has been electronically signed by Eilz ALANIS on October 17, 2022 20:31 CDT

## 2022-12-16 ENCOUNTER — OFFICE VISIT (OUTPATIENT)
Dept: FAMILY MEDICINE CLINIC | Facility: CLINIC | Age: 42
End: 2022-12-16

## 2022-12-16 VITALS
SYSTOLIC BLOOD PRESSURE: 118 MMHG | DIASTOLIC BLOOD PRESSURE: 78 MMHG | BODY MASS INDEX: 23.51 KG/M2 | HEIGHT: 67 IN | WEIGHT: 149.8 LBS

## 2022-12-16 DIAGNOSIS — R10.13 DYSPEPSIA: Primary | ICD-10-CM

## 2022-12-16 DIAGNOSIS — M72.2 PLANTAR FASCIITIS: ICD-10-CM

## 2022-12-16 DIAGNOSIS — R13.14 PHARYNGOESOPHAGEAL DYSPHAGIA: ICD-10-CM

## 2022-12-16 PROBLEM — M79.662 PAIN OF LEFT LOWER LEG: Status: RESOLVED | Noted: 2022-01-17 | Resolved: 2022-12-16

## 2022-12-16 PROBLEM — S82.302A: Chronic | Status: ACTIVE | Noted: 2022-02-15

## 2022-12-16 PROCEDURE — 99213 OFFICE O/P EST LOW 20 MIN: CPT | Performed by: FAMILY MEDICINE

## 2022-12-16 NOTE — PROGRESS NOTES
Subjective   Mami Delicia Garcia is a 42 y.o. female.  Requesting evaluation of a recurrent dyspepsia has a history of esophageal stricture in the past has had dilatation many years ago.  Having recurrent symptoms started back on proton pump blockers a few days ago with mild to moderate effectiveness.  Is going need further evaluation given history.  Also has a history of chronic plantar fasciitis right greater than left.  Is having to use heavy work boots.  Having recurrent symptoms.  Has had shots in the heels in the past.  I discussed treatment options.  History of Present Illness   HPI    The following portions of the patient's history were reviewed and updated as appropriate: allergies, current medications, past family history, past medical history, past social history, past surgical history and problem list.    Review of Systems  Review of Systems   Constitutional: Negative for activity change, appetite change, fatigue and unexpected weight change.   HENT: Negative for trouble swallowing and voice change.    Eyes: Negative for redness and visual disturbance.   Respiratory: Negative for cough and wheezing.    Cardiovascular: Negative for chest pain and palpitations.   Gastrointestinal: Positive for abdominal pain (Dyspepsia). Negative for constipation, diarrhea, nausea and vomiting.   Genitourinary: Negative for urgency.   Musculoskeletal: Positive for arthralgias (Fasciitis). Negative for joint swelling.   Neurological: Negative for syncope and headaches.   Hematological: Negative for adenopathy.   Psychiatric/Behavioral: Negative for sleep disturbance.       Objective   Physical Exam  Physical Exam  Constitutional:       Appearance: She is well-developed.   HENT:      Head: Normocephalic.   Eyes:      Pupils: Pupils are equal, round, and reactive to light.   Neck:      Thyroid: No thyromegaly.   Cardiovascular:      Rate and Rhythm: Normal rate.      Heart sounds: No murmur heard.    No friction rub. No  "gallop.   Pulmonary:      Breath sounds: Normal breath sounds.   Abdominal:      General: There is no distension.      Palpations: Abdomen is soft. There is no mass.      Tenderness: There is no abdominal tenderness.   Musculoskeletal:         General: Normal range of motion.      Cervical back: Normal range of motion.      Comments: Get up and go 3-5 so   Skin:     General: Skin is warm and dry.   Neurological:      Mental Status: She is alert and oriented to person, place, and time.      Deep Tendon Reflexes: Reflexes are normal and symmetric.   Psychiatric:         Mood and Affect: Mood normal.         Thought Content: Thought content normal.         Judgment: Judgment normal.           Visit Vitals  /78   Ht 170.2 cm (67\")   Wt 67.9 kg (149 lb 12.8 oz)   BMI 23.46 kg/m²     Body mass index is 23.46 kg/m².  /78   Ht 170.2 cm (67\")   Wt 67.9 kg (149 lb 12.8 oz)   BMI 23.46 kg/m²       Assessment/Plan   Diagnoses and all orders for this visit:    1. Dyspepsia (Primary)  -     esomeprazole (nexIUM) 20 MG capsule; Take 1 capsule by mouth Every Morning Before Breakfast. For stomach and esphagus  Dispense: 90 capsule; Refill: 3  -     Ambulatory Referral to Gastroenterology    2. Pharyngoesophageal dysphagia  -     esomeprazole (nexIUM) 20 MG capsule; Take 1 capsule by mouth Every Morning Before Breakfast. For stomach and esphagus  Dispense: 90 capsule; Refill: 3  -     Ambulatory Referral to Gastroenterology    3. Plantar fasciitis    Referral back to GI start proton pump Walter prophylactically.  For the feet start off with shoes designed for plantar fasciitis counseled on conservative therapy recheck any problem  "

## 2023-01-18 ENCOUNTER — OFFICE VISIT (OUTPATIENT)
Dept: ORTHOPEDIC SURGERY | Facility: CLINIC | Age: 43
End: 2023-01-18
Payer: COMMERCIAL

## 2023-01-18 VITALS — WEIGHT: 151.2 LBS | BODY MASS INDEX: 23.73 KG/M2 | HEIGHT: 67 IN

## 2023-01-18 DIAGNOSIS — M79.662 PAIN OF LEFT LOWER LEG: ICD-10-CM

## 2023-01-18 DIAGNOSIS — S82.302K: ICD-10-CM

## 2023-01-18 DIAGNOSIS — S82.302A NONDISPLACED FRACTURE OF DISTAL END OF LEFT TIBIA: Primary | ICD-10-CM

## 2023-01-18 PROCEDURE — 99214 OFFICE O/P EST MOD 30 MIN: CPT | Performed by: NURSE PRACTITIONER

## 2023-01-18 NOTE — PROGRESS NOTES
"Mami Garcia is a 42 y.o. female returns for     Chief Complaint   Patient presents with   • Left Tibia - Follow-up       HISTORY OF PRESENT ILLNESS:      Ms. Garica is a 42-year-old female who presents today to follow-up on left tibia fracture which occurred in 2022.  She was treated by Dr. Cedeno.  She was treated with a PTB brace.  She reports her pain is a 0 out of 10 in her tibia.  She states she has not worn her brace since last being seen and she has had no issues.  She reports returning to normal activity without pain.  She returns today for final x-rays.       She also reports that she is having a flareup of Planter fasciitis in her right foot.  She reports pain with the first few steps she takes.  Pain is in her heel.    CONCURRENT MEDICAL HISTORY:    The following portions of the patient's history were reviewed and updated as appropriate: allergies, current medications, past family history, past medical history, past social history, past surgical history and problem list.     ROS  No fevers or chills.  No chest pain or shortness of air.  No GI or  disturbances.  Right heel pain.    PHYSICAL EXAMINATION:       Ht 170.2 cm (67\")   Wt 68.6 kg (151 lb 3.2 oz)   BMI 23.68 kg/m²     Physical Exam  Vitals and nursing note reviewed.   Constitutional:       General: She is not in acute distress.     Appearance: She is well-developed. She is not toxic-appearing or diaphoretic.   HENT:      Head: Normocephalic.   Eyes:      General: No scleral icterus.  Pulmonary:      Effort: Pulmonary effort is normal. No respiratory distress.   Skin:     General: Skin is warm and dry.   Neurological:      Mental Status: She is alert and oriented to person, place, and time.   Psychiatric:         Behavior: Behavior normal.         Thought Content: Thought content normal.         Judgment: Judgment normal.         GAIT:     [x]  Normal  []  Antalgic    Assistive device: [x]  None  []  Walker     []  Crutches  []  " Cane     []  Wheelchair  []  Stretcher    Right Ankle Exam     Comments:  Pain with palpation of the plantar fascia.  Pain at calcaneus.      Left Knee Exam   Left knee exam is normal.              No results found.          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  Patient has returned to normal activity without bracing and without issue.  Patient has no restrictions and no further follow-up required.  In terms of Planter fasciitis,  patient instructed to do gentle stretching exercises.  Patient instructed to use frozen water bottle to roll under her foot.  Patient to use NSAIDs.  Patient also prescribed Medrol Dosepak.  Patient states she is waiting for her new shoes to come in, she states she ordered a pair of shoes that is supposed to be good for Planter fasciitis.  Patient to return if this does not help alleviate Planter fasciitis pain.    Return if symptoms worsen or fail to improve.    EMR Dragon/Transciption Disclaimer: Some of this note may be an electronic transcription/translation of spoken language to printed text.  The electronic translation of spoken language may permit erroneous, or at times, nonsensical words or phrases to be inadvertently transcribed. Although I have reviewed the note for such errors, some may still exist.       This document has been electronically signed by Eliz ALANIS on January 18, 2023 16:18 CST